# Patient Record
Sex: MALE | HISPANIC OR LATINO | Employment: OTHER | URBAN - METROPOLITAN AREA
[De-identification: names, ages, dates, MRNs, and addresses within clinical notes are randomized per-mention and may not be internally consistent; named-entity substitution may affect disease eponyms.]

---

## 2017-03-01 ENCOUNTER — TELEPHONE (OUTPATIENT)
Dept: VASCULAR LAB | Facility: MEDICAL CENTER | Age: 82
End: 2017-03-01

## 2017-09-13 DIAGNOSIS — I48.91 ATRIAL FIBRILLATION, UNSPECIFIED TYPE (HCC): ICD-10-CM

## 2017-09-18 ENCOUNTER — ANTICOAGULATION VISIT (OUTPATIENT)
Dept: VASCULAR LAB | Facility: MEDICAL CENTER | Age: 82
End: 2017-09-18
Attending: INTERNAL MEDICINE
Payer: MEDICARE

## 2017-09-18 VITALS — DIASTOLIC BLOOD PRESSURE: 81 MMHG | SYSTOLIC BLOOD PRESSURE: 147 MMHG | HEART RATE: 69 BPM

## 2017-09-18 DIAGNOSIS — I48.91 ATRIAL FIBRILLATION, UNSPECIFIED TYPE (HCC): ICD-10-CM

## 2017-09-18 PROCEDURE — 99211 OFF/OP EST MAY X REQ PHY/QHP: CPT | Performed by: NURSE PRACTITIONER

## 2017-09-18 RX ORDER — ASPIRIN 81 MG/1
81 TABLET, CHEWABLE ORAL DAILY
Status: ON HOLD | COMMUNITY
End: 2020-04-19

## 2017-09-18 NOTE — PROGRESS NOTES
Anticoagulation Summary  As of 9/18/2017    INR goal:   2.0-3.0   TTR:   58.6 % (2.6 mo)   Today's INR:   No new INR was available at the time of this encounter.   Maintenance plan:   2.5 mg (2.5 mg x 1) on Sun, Thu; 3.75 mg (2.5 mg x 1.5) all other days   Weekly total:   23.75 mg   Plan last modified:   Lisa Veronica, PharmD (3/29/2016)   Next INR check:      Target end date:   Indefinite    Indications    Atrial fibrillation (CMS-HCC) [I48.91]             Anticoagulation Episode Summary     INR check location:   [facility]    Preferred lab:       Send INR reminders to:       Comments:         Anticoagulation Care Providers     Provider Role Specialty Phone number    Porfirio Marshall M.D. Referring Cardiology [phone]    [facility] Responsible  [phone]        Anticoagulation Patient Findings      HPI:  Des Keller seen in clinic today to reestablish care with anticoagulation clinic for hx of AF. Patient states he was taken off warfarin over 1 year ago and is taking baby ASA daily. [id number] score = 3. We discussed risks/benefits of restarting warfarin, however, he declines.  He understands he has an increased risk of stroke with AF. Still wants to stay on ASA.     Pt to f/u with his PCP and cardiologist. Med rec updated.    Estela SANTANA

## 2018-03-22 ENCOUNTER — HOSPITAL ENCOUNTER (OUTPATIENT)
Dept: LAB | Facility: MEDICAL CENTER | Age: 83
End: 2018-03-22
Attending: NURSE PRACTITIONER
Payer: MEDICARE

## 2018-03-22 PROCEDURE — 87186 SC STD MICRODIL/AGAR DIL: CPT

## 2018-03-22 PROCEDURE — 87086 URINE CULTURE/COLONY COUNT: CPT

## 2018-03-22 PROCEDURE — 87077 CULTURE AEROBIC IDENTIFY: CPT

## 2018-03-24 LAB
BACTERIA UR CULT: ABNORMAL
BACTERIA UR CULT: ABNORMAL
SIGNIFICANT IND 70042: ABNORMAL
SITE SITE: ABNORMAL
SOURCE SOURCE: ABNORMAL

## 2018-05-24 ENCOUNTER — PATIENT OUTREACH (OUTPATIENT)
Dept: HEALTH INFORMATION MANAGEMENT | Facility: OTHER | Age: 83
End: 2018-05-24

## 2018-05-24 NOTE — PROGRESS NOTES
Outcome: Left Message to schedule est sherita/verify pcp    Please transfer to Patient Outreach Team at [phone] when patient returns call.    WebIZ Checked & Epic Updated:  yes    [facility] Verified: yes    Attempt # 1

## 2018-08-16 ENCOUNTER — APPOINTMENT (OUTPATIENT)
Dept: CARDIOLOGY | Facility: MEDICAL CENTER | Age: 83
End: 2018-08-16
Attending: PHYSICIAN ASSISTANT
Payer: MEDICARE

## 2019-03-05 ENCOUNTER — PATIENT OUTREACH (OUTPATIENT)
Dept: HEALTH INFORMATION MANAGEMENT | Facility: OTHER | Age: 84
End: 2019-03-05

## 2019-03-07 NOTE — PROGRESS NOTES
Situation    TCN met with patient at SNF to discuss his DC plan and [facility] program. Patient plans to DC home alone with family support and [facility]. Patient is agreeable to CCM program. Intake not completed this date due to patient being needed in therapy.    Background       Patient was in [place] from 2/1/19 til 2/5/19 after a GLF resulted in a R hip fracture requiring an IMN. Patient  PMH includes, HTN, bladder outlet obstruction, A-fib, AV block, pacemaker and EF of 60-65%. Patient was admitted to SNF at [place] on 2/5/19 for strength and mobility training prior to DC home alone.   Assessment    Patient is progressing well at SNF and scheduled to DC home alone with intermittent family support on 3/8/19 with [facility] and F[facility]. Patient is currently at a SBA/Sup level for all ADLS and mobility. Patient feels he is ready to DC home at this time and feels he will have sufficient support from his family for the care he will need.    Recommendation TCN to contact patient on 3/11/19.      Janeth Redmond, PT DPT TCN  [phone]

## 2019-03-11 ENCOUNTER — PATIENT OUTREACH (OUTPATIENT)
Dept: HEALTH INFORMATION MANAGEMENT | Facility: OTHER | Age: 84
End: 2019-03-11

## 2019-03-11 NOTE — PROGRESS NOTES
Situation    TCN contacted patient at home. Patient reports he is doing well and his family is with him most of the time. HH is supposed to see patient later. Patient feels he has enough support at home and requested to discontinue TCN follow up. Patient has TCN card with phone number if needs arise.    Background       Patient was in [place] from 2/1/19 til 2/5/19 after a GLF resulted in a R hip fracture requiring an IMN. Patient  PMH includes, HTN, bladder outlet obstruction, A-fib, AV block, pacemaker and EF of 60-65%. Patient was admitted to SNF at [place] on 2/5/19 for strength and mobility training prior to DC home alone.   Assessment    Patient is doing well at home and well supported by family. Patient requested TCN discontinue follow up calls at this time as he feels he has enough support and check in from family and HH.    Recommendation TCN to DC patient from follow up at this time per patient request. Patient has TCN contact information if new needs or concerns arise.      Janeth Redmond, PT DPT TCN  [phone]

## 2020-02-04 ENCOUNTER — ANTICOAGULATION MONITORING (OUTPATIENT)
Dept: VASCULAR LAB | Facility: MEDICAL CENTER | Age: 85
End: 2020-02-04

## 2020-02-04 NOTE — PROGRESS NOTES
Discharged from [facility].  Pt is no longer anticoagulated.  Estefanía Collins, Clinical Pharmacist, CDE, CACP

## 2020-04-16 ENCOUNTER — HOSPITAL ENCOUNTER (INPATIENT)
Facility: MEDICAL CENTER | Age: 85
LOS: 7 days | DRG: 536 | End: 2020-04-24
Attending: EMERGENCY MEDICINE | Admitting: HOSPITALIST
Payer: MEDICARE

## 2020-04-16 ENCOUNTER — APPOINTMENT (OUTPATIENT)
Dept: RADIOLOGY | Facility: MEDICAL CENTER | Age: 85
DRG: 536 | End: 2020-04-16
Attending: EMERGENCY MEDICINE
Payer: MEDICARE

## 2020-04-16 DIAGNOSIS — S32.509A CLOSED FRACTURE OF PUBIS, UNSPECIFIED PORTION OF PUBIS, UNSPECIFIED LATERALITY, INITIAL ENCOUNTER (HCC): ICD-10-CM

## 2020-04-16 DIAGNOSIS — R50.9 FEVER, UNSPECIFIED FEVER CAUSE: ICD-10-CM
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
## 2020-04-16 LAB
ALBUMIN SERPL BCP-MCNC: 3.9 G/DL (ref 3.2–4.9)
ALBUMIN/GLOB SERPL: 1.4 G/DL
ALP SERPL-CCNC: 100 U/L (ref 30–99)
ALT SERPL-CCNC: 20 U/L (ref 2–50)
ANION GAP SERPL CALC-SCNC: 13 MMOL/L (ref 7–16)
APPEARANCE UR: CLEAR
AST SERPL-CCNC: 24 U/L (ref 12–45)
BACTERIA #/AREA URNS HPF: NEGATIVE /HPF
BASOPHILS # BLD AUTO: 0.2 % (ref 0–1.8)
BASOPHILS # BLD: 0.03 K/UL (ref 0–0.12)
BILIRUB SERPL-MCNC: 3.3 MG/DL (ref 0.1–1.5)
BILIRUB UR QL STRIP.AUTO: NEGATIVE
BUN SERPL-MCNC: 15 MG/DL (ref 8–22)
CALCIUM SERPL-MCNC: 9.1 MG/DL (ref 8.5–10.5)
CHLORIDE SERPL-SCNC: 100 MMOL/L (ref 96–112)
CK SERPL-CCNC: 282 U/L (ref 0–154)
CO2 SERPL-SCNC: 19 MMOL/L (ref 20–33)
COLOR UR: YELLOW
COVID ORDER STATUS COVID19: NORMAL
CREAT SERPL-MCNC: 1.01 MG/DL (ref 0.5–1.4)
CRP SERPL HS-MCNC: 3.5 MG/DL (ref 0–0.75)
EKG IMPRESSION: NORMAL
EOSINOPHIL # BLD AUTO: 0 K/UL (ref 0–0.51)
EOSINOPHIL NFR BLD: 0 % (ref 0–6.9)
EPI CELLS #/AREA URNS HPF: NEGATIVE /HPF
ERYTHROCYTE [DISTWIDTH] IN BLOOD BY AUTOMATED COUNT: 44 FL (ref 35.9–50)
ERYTHROCYTE [SEDIMENTATION RATE] IN BLOOD BY WESTERGREN METHOD: 5 MM/HOUR (ref 0–20)
FERRITIN SERPL-MCNC: 124 NG/ML (ref 22–322)
FLUAV RNA SPEC QL NAA+PROBE: NEGATIVE
FLUBV RNA SPEC QL NAA+PROBE: NEGATIVE
GLOBULIN SER CALC-MCNC: 2.8 G/DL (ref 1.9–3.5)
GLUCOSE SERPL-MCNC: 128 MG/DL (ref 65–99)
GLUCOSE UR STRIP.AUTO-MCNC: NEGATIVE MG/DL
HCT VFR BLD AUTO: 42 % (ref 42–52)
HGB BLD-MCNC: 14.4 G/DL (ref 14–18)
HYALINE CASTS #/AREA URNS LPF: ABNORMAL /LPF
IMM GRANULOCYTES # BLD AUTO: 0.1 K/UL (ref 0–0.11)
IMM GRANULOCYTES NFR BLD AUTO: 0.7 % (ref 0–0.9)
INR PPP: 1.37 (ref 0.87–1.13)
KETONES UR STRIP.AUTO-MCNC: NEGATIVE MG/DL
LACTATE BLD-SCNC: 1.6 MMOL/L (ref 0.5–2)
LACTATE BLD-SCNC: 2.4 MMOL/L (ref 0.5–2)
LDH SERPL L TO P-CCNC: 274 U/L (ref 107–266)
LEUKOCYTE ESTERASE UR QL STRIP.AUTO: NEGATIVE
LYMPHOCYTES # BLD AUTO: 0.85 K/UL (ref 1–4.8)
LYMPHOCYTES NFR BLD: 5.6 % (ref 22–41)
MAGNESIUM SERPL-MCNC: 1.5 MG/DL (ref 1.5–2.5)
MCH RBC QN AUTO: 32.2 PG (ref 27–33)
MCHC RBC AUTO-ENTMCNC: 34.3 G/DL (ref 33.7–35.3)
MCV RBC AUTO: 94 FL (ref 81.4–97.8)
MICRO URNS: ABNORMAL
MONOCYTES # BLD AUTO: 1.23 K/UL (ref 0–0.85)
MONOCYTES NFR BLD AUTO: 8.1 % (ref 0–13.4)
NEUTROPHILS # BLD AUTO: 12.93 K/UL (ref 1.82–7.42)
NEUTROPHILS NFR BLD: 85.4 % (ref 44–72)
NITRITE UR QL STRIP.AUTO: NEGATIVE
NRBC # BLD AUTO: 0 K/UL
NRBC BLD-RTO: 0 /100 WBC
PH UR STRIP.AUTO: 5 [PH] (ref 5–8)
PHOSPHATE SERPL-MCNC: 2.5 MG/DL (ref 2.5–4.5)
PLATELET # BLD AUTO: 120 K/UL (ref 164–446)
PMV BLD AUTO: 10.7 FL (ref 9–12.9)
POTASSIUM SERPL-SCNC: 4.3 MMOL/L (ref 3.6–5.5)
PROCALCITONIN SERPL-MCNC: <0.05 NG/ML
PROT SERPL-MCNC: 6.7 G/DL (ref 6–8.2)
PROT UR QL STRIP: NEGATIVE MG/DL
PROTHROMBIN TIME: 17.2 SEC (ref 12–14.6)
RBC # BLD AUTO: 4.47 M/UL (ref 4.7–6.1)
RBC # URNS HPF: ABNORMAL /HPF
RBC UR QL AUTO: ABNORMAL
SARS-COV-2 RNA RESP QL NAA+PROBE: NEGATIVE
SODIUM SERPL-SCNC: 132 MMOL/L (ref 135–145)
SP GR UR STRIP.AUTO: 1.01
SPECIMEN SOURCE: NORMAL
UROBILINOGEN UR STRIP.AUTO-MCNC: 1 MG/DL
WBC # BLD AUTO: 15.1 K/UL (ref 4.8–10.8)
WBC #/AREA URNS HPF: ABNORMAL /HPF

## 2020-04-16 PROCEDURE — 700117 HCHG RX CONTRAST REV CODE 255: Performed by: EMERGENCY MEDICINE

## 2020-04-16 PROCEDURE — 74177 CT ABD & PELVIS W/CONTRAST: CPT

## 2020-04-16 PROCEDURE — 84145 PROCALCITONIN (PCT): CPT

## 2020-04-16 PROCEDURE — 85610 PROTHROMBIN TIME: CPT

## 2020-04-16 PROCEDURE — 99285 EMERGENCY DEPT VISIT HI MDM: CPT

## 2020-04-16 PROCEDURE — 83615 LACTATE (LD) (LDH) ENZYME: CPT

## 2020-04-16 PROCEDURE — 71045 X-RAY EXAM CHEST 1 VIEW: CPT

## 2020-04-16 PROCEDURE — 70450 CT HEAD/BRAIN W/O DYE: CPT

## 2020-04-16 PROCEDURE — 93005 ELECTROCARDIOGRAM TRACING: CPT | Performed by: EMERGENCY MEDICINE

## 2020-04-16 PROCEDURE — 73501 X-RAY EXAM HIP UNI 1 VIEW: CPT | Mod: LT

## 2020-04-16 PROCEDURE — 94760 N-INVAS EAR/PLS OXIMETRY 1: CPT

## 2020-04-16 PROCEDURE — 96368 THER/DIAG CONCURRENT INF: CPT

## 2020-04-16 PROCEDURE — G2023 SPECIMEN COLLECT COVID-19: HCPCS | Performed by: EMERGENCY MEDICINE

## 2020-04-16 PROCEDURE — 87040 BLOOD CULTURE FOR BACTERIA: CPT

## 2020-04-16 PROCEDURE — 80053 COMPREHEN METABOLIC PANEL: CPT

## 2020-04-16 PROCEDURE — 87086 URINE CULTURE/COLONY COUNT: CPT

## 2020-04-16 PROCEDURE — A9270 NON-COVERED ITEM OR SERVICE: HCPCS | Performed by: EMERGENCY MEDICINE

## 2020-04-16 PROCEDURE — 700102 HCHG RX REV CODE 250 W/ 637 OVERRIDE(OP): Performed by: EMERGENCY MEDICINE

## 2020-04-16 PROCEDURE — 700111 HCHG RX REV CODE 636 W/ 250 OVERRIDE (IP): Performed by: EMERGENCY MEDICINE

## 2020-04-16 PROCEDURE — 81001 URINALYSIS AUTO W/SCOPE: CPT

## 2020-04-16 PROCEDURE — 96365 THER/PROPH/DIAG IV INF INIT: CPT

## 2020-04-16 PROCEDURE — U0004 COV-19 TEST NON-CDC HGH THRU: HCPCS

## 2020-04-16 PROCEDURE — 85025 COMPLETE CBC W/AUTO DIFF WBC: CPT

## 2020-04-16 PROCEDURE — 87502 INFLUENZA DNA AMP PROBE: CPT

## 2020-04-16 PROCEDURE — 82728 ASSAY OF FERRITIN: CPT

## 2020-04-16 PROCEDURE — 83735 ASSAY OF MAGNESIUM: CPT

## 2020-04-16 PROCEDURE — 700105 HCHG RX REV CODE 258: Performed by: EMERGENCY MEDICINE

## 2020-04-16 PROCEDURE — 84100 ASSAY OF PHOSPHORUS: CPT

## 2020-04-16 PROCEDURE — 83605 ASSAY OF LACTIC ACID: CPT | Mod: 91

## 2020-04-16 PROCEDURE — 85652 RBC SED RATE AUTOMATED: CPT

## 2020-04-16 PROCEDURE — 700101 HCHG RX REV CODE 250: Performed by: EMERGENCY MEDICINE

## 2020-04-16 PROCEDURE — 36415 COLL VENOUS BLD VENIPUNCTURE: CPT

## 2020-04-16 PROCEDURE — 86140 C-REACTIVE PROTEIN: CPT

## 2020-04-16 PROCEDURE — 82550 ASSAY OF CK (CPK): CPT

## 2020-04-16 RX ORDER — SODIUM CHLORIDE 9 MG/ML
500 INJECTION, SOLUTION INTRAVENOUS ONCE
Status: COMPLETED | OUTPATIENT
Start: 2020-04-16 | End: 2020-04-16

## 2020-04-16 RX ORDER — OXYCODONE HYDROCHLORIDE 5 MG/1
5 TABLET ORAL ONCE
Status: COMPLETED | OUTPATIENT
Start: 2020-04-16 | End: 2020-04-16

## 2020-04-16 RX ORDER — ACETAMINOPHEN 325 MG/1
650 TABLET ORAL ONCE
Status: COMPLETED | OUTPATIENT
Start: 2020-04-16 | End: 2020-04-16

## 2020-04-16 RX ADMIN — CEFEPIME 2 G: 2 INJECTION, POWDER, FOR SOLUTION INTRAVENOUS at 21:12

## 2020-04-16 RX ADMIN — ACETAMINOPHEN 650 MG: 325 TABLET, FILM COATED ORAL at 19:51

## 2020-04-16 RX ADMIN — SODIUM CHLORIDE 500 ML: 9 INJECTION, SOLUTION INTRAVENOUS at 19:50

## 2020-04-16 RX ADMIN — OXYCODONE HYDROCHLORIDE 5 MG: 5 TABLET ORAL at 21:32

## 2020-04-16 RX ADMIN — IOHEXOL 100 ML: 350 INJECTION, SOLUTION INTRAVENOUS at 21:00

## 2020-04-16 RX ADMIN — METRONIDAZOLE 500 MG: 500 INJECTION, SOLUTION INTRAVENOUS at 21:33

## 2020-04-16 ASSESSMENT — LIFESTYLE VARIABLES
EVER FELT BAD OR GUILTY ABOUT YOUR DRINKING: NO
HAVE PEOPLE ANNOYED YOU BY CRITICIZING YOUR DRINKING: NO
DO YOU DRINK ALCOHOL: YES
CONSUMPTION TOTAL: NEGATIVE
TOTAL SCORE: 0
HAVE YOU EVER FELT YOU SHOULD CUT DOWN ON YOUR DRINKING: NO
ON A TYPICAL DAY WHEN YOU DRINK ALCOHOL HOW MANY DRINKS DO YOU HAVE: 1
AVERAGE NUMBER OF DAYS PER WEEK YOU HAVE A DRINK CONTAINING ALCOHOL: 1
DOES PATIENT WANT TO STOP DRINKING: NO
TOTAL SCORE: 0
HOW MANY TIMES IN THE PAST YEAR HAVE YOU HAD 5 OR MORE DRINKS IN A DAY: 0
TOTAL SCORE: 0
EVER HAD A DRINK FIRST THING IN THE MORNING TO STEADY YOUR NERVES TO GET RID OF A HANGOVER: NO

## 2020-04-17 ENCOUNTER — PATIENT OUTREACH (OUTPATIENT)
Dept: HEALTH INFORMATION MANAGEMENT | Facility: OTHER | Age: 85
End: 2020-04-17

## 2020-04-17 PROBLEM — Z20.822 SUSPECTED COVID-19 VIRUS INFECTION: Status: ACTIVE | Noted: 2020-04-17

## 2020-04-17 PROBLEM — S32.599A PUBIC RAMUS FRACTURE (HCC): Status: ACTIVE | Noted: 2020-04-17

## 2020-04-17 PROBLEM — I48.0 PAROXYSMAL ATRIAL FIBRILLATION (HCC): Status: ACTIVE | Noted: 2020-04-17

## 2020-04-17 LAB
COVID ORDER STATUS COVID19: NORMAL
LACTATE BLD-SCNC: 1.4 MMOL/L (ref 0.5–2)
SARS-COV-2 RNA RESP QL NAA+PROBE: NEGATIVE
SPECIMEN SOURCE: NORMAL

## 2020-04-17 PROCEDURE — 97166 OT EVAL MOD COMPLEX 45 MIN: CPT

## 2020-04-17 PROCEDURE — 99223 1ST HOSP IP/OBS HIGH 75: CPT | Mod: AI | Performed by: HOSPITALIST

## 2020-04-17 PROCEDURE — 700102 HCHG RX REV CODE 250 W/ 637 OVERRIDE(OP): Performed by: HOSPITALIST

## 2020-04-17 PROCEDURE — A9270 NON-COVERED ITEM OR SERVICE: HCPCS | Performed by: HOSPITALIST

## 2020-04-17 PROCEDURE — G2023 SPECIMEN COLLECT COVID-19: HCPCS | Performed by: INTERNAL MEDICINE

## 2020-04-17 PROCEDURE — 700111 HCHG RX REV CODE 636 W/ 250 OVERRIDE (IP): Performed by: HOSPITALIST

## 2020-04-17 PROCEDURE — U0004 COV-19 TEST NON-CDC HGH THRU: HCPCS

## 2020-04-17 PROCEDURE — 770021 HCHG ROOM/CARE - ISO PRIVATE

## 2020-04-17 PROCEDURE — 97162 PT EVAL MOD COMPLEX 30 MIN: CPT

## 2020-04-17 RX ORDER — ONDANSETRON 4 MG/1
4 TABLET, ORALLY DISINTEGRATING ORAL EVERY 4 HOURS PRN
Status: DISCONTINUED | OUTPATIENT
Start: 2020-04-17 | End: 2020-04-24 | Stop reason: HOSPADM

## 2020-04-17 RX ORDER — OXYCODONE HYDROCHLORIDE 5 MG/1
2.5 TABLET ORAL
Status: DISCONTINUED | OUTPATIENT
Start: 2020-04-17 | End: 2020-04-24 | Stop reason: HOSPADM

## 2020-04-17 RX ORDER — CARVEDILOL 6.25 MG/1
6.25 TABLET ORAL 2 TIMES DAILY WITH MEALS
Status: DISCONTINUED | OUTPATIENT
Start: 2020-04-17 | End: 2020-04-17

## 2020-04-17 RX ORDER — AMOXICILLIN 250 MG
2 CAPSULE ORAL 2 TIMES DAILY
Status: DISCONTINUED | OUTPATIENT
Start: 2020-04-17 | End: 2020-04-24 | Stop reason: HOSPADM

## 2020-04-17 RX ORDER — BISACODYL 10 MG
10 SUPPOSITORY, RECTAL RECTAL
Status: DISCONTINUED | OUTPATIENT
Start: 2020-04-17 | End: 2020-04-24 | Stop reason: HOSPADM

## 2020-04-17 RX ORDER — LOSARTAN POTASSIUM 50 MG/1
50 TABLET ORAL DAILY
Status: DISCONTINUED | OUTPATIENT
Start: 2020-04-17 | End: 2020-04-17

## 2020-04-17 RX ORDER — ASPIRIN 81 MG/1
81 TABLET, CHEWABLE ORAL DAILY
Status: DISCONTINUED | OUTPATIENT
Start: 2020-04-17 | End: 2020-04-17

## 2020-04-17 RX ORDER — ONDANSETRON 2 MG/ML
4 INJECTION INTRAMUSCULAR; INTRAVENOUS EVERY 4 HOURS PRN
Status: DISCONTINUED | OUTPATIENT
Start: 2020-04-17 | End: 2020-04-24 | Stop reason: HOSPADM

## 2020-04-17 RX ORDER — CLONIDINE HYDROCHLORIDE 0.1 MG/1
0.1 TABLET ORAL 3 TIMES DAILY PRN
Status: DISCONTINUED | OUTPATIENT
Start: 2020-04-17 | End: 2020-04-24 | Stop reason: HOSPADM

## 2020-04-17 RX ORDER — ACETAMINOPHEN 325 MG/1
650 TABLET ORAL EVERY 6 HOURS PRN
Status: DISCONTINUED | OUTPATIENT
Start: 2020-04-17 | End: 2020-04-24 | Stop reason: HOSPADM

## 2020-04-17 RX ORDER — OXYCODONE HYDROCHLORIDE 5 MG/1
5 TABLET ORAL
Status: DISCONTINUED | OUTPATIENT
Start: 2020-04-17 | End: 2020-04-24 | Stop reason: HOSPADM

## 2020-04-17 RX ORDER — POLYETHYLENE GLYCOL 3350 17 G/17G
1 POWDER, FOR SOLUTION ORAL
Status: DISCONTINUED | OUTPATIENT
Start: 2020-04-17 | End: 2020-04-24 | Stop reason: HOSPADM

## 2020-04-17 RX ORDER — MORPHINE SULFATE 4 MG/ML
2 INJECTION, SOLUTION INTRAMUSCULAR; INTRAVENOUS
Status: DISCONTINUED | OUTPATIENT
Start: 2020-04-17 | End: 2020-04-24

## 2020-04-17 RX ADMIN — OXYCODONE HYDROCHLORIDE 5 MG: 5 TABLET ORAL at 01:45

## 2020-04-17 RX ADMIN — ENOXAPARIN SODIUM 40 MG: 100 INJECTION SUBCUTANEOUS at 05:33

## 2020-04-17 RX ADMIN — ACETAMINOPHEN 650 MG: 325 TABLET, FILM COATED ORAL at 21:09

## 2020-04-17 RX ADMIN — SENNOSIDES AND DOCUSATE SODIUM 2 TABLET: 8.6; 5 TABLET ORAL at 05:34

## 2020-04-17 ASSESSMENT — COGNITIVE AND FUNCTIONAL STATUS - GENERAL
STANDING UP FROM CHAIR USING ARMS: A LOT
MOVING TO AND FROM BED TO CHAIR: UNABLE
DAILY ACTIVITIY SCORE: 18
SUGGESTED CMS G CODE MODIFIER DAILY ACTIVITY: CK
WALKING IN HOSPITAL ROOM: TOTAL
DRESSING REGULAR LOWER BODY CLOTHING: A LOT
MOVING FROM LYING ON BACK TO SITTING ON SIDE OF FLAT BED: UNABLE
MOBILITY SCORE: 7
TURNING FROM BACK TO SIDE WHILE IN FLAT BAD: UNABLE
SUGGESTED CMS G CODE MODIFIER MOBILITY: CM
TOILETING: A LOT
CLIMB 3 TO 5 STEPS WITH RAILING: TOTAL
HELP NEEDED FOR BATHING: A LOT

## 2020-04-17 ASSESSMENT — PATIENT HEALTH QUESTIONNAIRE - PHQ9
1. LITTLE INTEREST OR PLEASURE IN DOING THINGS: NOT AT ALL
SUM OF ALL RESPONSES TO PHQ9 QUESTIONS 1 AND 2: 0
2. FEELING DOWN, DEPRESSED, IRRITABLE, OR HOPELESS: NOT AT ALL

## 2020-04-17 ASSESSMENT — ENCOUNTER SYMPTOMS
EYES NEGATIVE: 1
CARDIOVASCULAR NEGATIVE: 1
PSYCHIATRIC NEGATIVE: 1
GASTROINTESTINAL NEGATIVE: 1
CONSTITUTIONAL NEGATIVE: 1
NEUROLOGICAL NEGATIVE: 1
COUGH: 1
FALLS: 1

## 2020-04-17 ASSESSMENT — LIFESTYLE VARIABLES
EVER HAD A DRINK FIRST THING IN THE MORNING TO STEADY YOUR NERVES TO GET RID OF A HANGOVER: NO
TOTAL SCORE: 0
HAVE PEOPLE ANNOYED YOU BY CRITICIZING YOUR DRINKING: NO
DOES PATIENT WANT TO STOP DRINKING: NO
HAVE YOU EVER FELT YOU SHOULD CUT DOWN ON YOUR DRINKING: NO
ON A TYPICAL DAY WHEN YOU DRINK ALCOHOL HOW MANY DRINKS DO YOU HAVE: 0
AVERAGE NUMBER OF DAYS PER WEEK YOU HAVE A DRINK CONTAINING ALCOHOL: 0
TOTAL SCORE: 0
EVER_SMOKED: NEVER
CONSUMPTION TOTAL: NEGATIVE
TOTAL SCORE: 0
ALCOHOL_USE: NO
EVER FELT BAD OR GUILTY ABOUT YOUR DRINKING: NO
HOW MANY TIMES IN THE PAST YEAR HAVE YOU HAD 5 OR MORE DRINKS IN A DAY: 0

## 2020-04-17 ASSESSMENT — COPD QUESTIONNAIRES
IN THE PAST 12 MONTHS DO YOU DO LESS THAN YOU USED TO BECAUSE OF YOUR BREATHING PROBLEMS: DISAGREE/UNSURE
COPD SCREENING SCORE: 2
DURING THE PAST 4 WEEKS HOW MUCH DID YOU FEEL SHORT OF BREATH: NONE/LITTLE OF THE TIME
HAVE YOU SMOKED AT LEAST 100 CIGARETTES IN YOUR ENTIRE LIFE: NO/DON'T KNOW
DO YOU EVER COUGH UP ANY MUCUS OR PHLEGM?: NO/ONLY WITH OCCASIONAL COLDS OR INFECTIONS

## 2020-04-17 ASSESSMENT — FIBROSIS 4 INDEX: FIB4 SCORE: 4.34

## 2020-04-17 ASSESSMENT — GAIT ASSESSMENTS: GAIT LEVEL OF ASSIST: UNABLE TO PARTICIPATE

## 2020-04-17 ASSESSMENT — ACTIVITIES OF DAILY LIVING (ADL): TOILETING: INDEPENDENT

## 2020-04-17 NOTE — THERAPY
"Physical Therapy Evaluation completed.   Bed Mobility:  Supine to Sit: Maximal Assist  Transfers: Sit to Stand: Max Assist  Gait: Level Of Assist: Unable to Participate   Plan of Care: Will benefit from Physical Therapy 4 times per week  Discharge Recommendations: Equipment: Will Continue to Assess for Equipment Needs. Post-acute therapy Discharge to a transitional care facility for continued skilled therapy services.    See \"Rehab Therapy-Acute\" Patient Summary Report for complete documentation.     89 year old male who had a GLF resulting in a non operative pubis ramus fracture and PMHx includes essential HTN, BPH, and A-fib.  Patient currently being ruled out for COVID-19.  Patient demonstrates limitations in strength, ROM, balance, activity tolerance, and sensation which are likely contributing to patient's decline in functional mobility.  Patient currently is not safe to go home, recommending placement to skilled nursing facility for rehabilitation before going home.      Jose Kelley PT  "

## 2020-04-17 NOTE — CONSULTS
4/17/2020    Reason for consultation: Left pelvic ring injury    Consultation on Des Keller at the request of Dr. Sams for a left pelvic ring injury.  The patient is a 89 y.o. male who presents with a left pelvic ring injury due to ground level fall.  The patient noted immediate pain and inability to move the affected extremity due to pain.  They were evaluated in the ER, and Orthopedics was consulted. Patient denies numbness, paresthesias, loss of consciousness or other symptoms related to his pelvis aside from worsened left knee pain.  He also endorses some shortness of breath, and was found to be febrile in the ER.  He is a COVID rule out, initial test negative.    Past Medical History:   Diagnosis Date   • A-fib (HCC)    • BPH (benign prostatic hyperplasia)    • Hypertension    • Hypertension 5/19/2011   • Pacemaker    • Renal insufficiency 4/19/2016       Past Surgical History:   Procedure Laterality Date   • APPENDECTOMY     • CHOLECYSTECTOMY         Medications  No current facility-administered medications on file prior to encounter.      Current Outpatient Medications on File Prior to Encounter   Medication Sig Dispense Refill   • Non Formulary Request EMS reports pt took coumadin at home  PT report he take something for blood   Pt also reports taking something for prostate    4/16/2019     • aspirin (ASA) 81 MG Chew Tab chewable tablet Take 81 mg by mouth every day.     • carvedilol (COREG) 6.25 MG Tab Take 1 Tab by mouth 2 times a day, with meals. 60 Tab 1   • acetaminophen (TYLENOL) 500 MG Tab Take 500 mg by mouth every 6 hours as needed for Moderate Pain.     • losartan (COZAAR) 50 MG Tab Take 1 Tab by mouth every day. 30 Tab 3       Allergies  Patient has no known allergies.    ROS  Per HPI. All other systems were reviewed and found to be negative    History reviewed. No pertinent family history.    Social History     Socioeconomic History   • Marital status:      Spouse name: Not on  "file   • Number of children: Not on file   • Years of education: Not on file   • Highest education level: Not on file   Occupational History   • Not on file   Social Needs   • Financial resource strain: Not on file   • Food insecurity     Worry: Not on file     Inability: Not on file   • Transportation needs     Medical: Not on file     Non-medical: Not on file   Tobacco Use   • Smoking status: Never Smoker   • Smokeless tobacco: Never Used   Substance and Sexual Activity   • Alcohol use: No   • Drug use: Never   • Sexual activity: Not on file     Comment:  ,[place]   Lifestyle   • Physical activity     Days per week: Not on file     Minutes per session: Not on file   • Stress: Not on file   Relationships   • Social connections     Talks on phone: Not on file     Gets together: Not on file     Attends [employer] service: Not on file     Active member of club or organization: Not on file     Attends meetings of clubs or organizations: Not on file     Relationship status: Not on file   • Intimate partner violence     Fear of current or ex partner: Not on file     Emotionally abused: Not on file     Physically abused: Not on file     Forced sexual activity: Not on file   Other Topics Concern   • Not on file   Social History Narrative   • Not on file       Physical Exam  Vitals  /76   Pulse 61   Temp 36.3 °C (97.4 °F) (Temporal)   Resp (!) 22   Ht 1.803 m (5' 11\")   Wt 70.7 kg (155 lb 13.8 oz)   SpO2 94%   General: Well Developed, Well Nourished, no acute distress  Psychiatric: Alert and oriented x3, appropriate responses to questions, pleasant mood and affect.  HEENT: Normocephalic, atraumatic  Eyes: Anicteric, PERRLA, EOMI  Neck: Supple, nontender, no masses  Chest: Symmetric expansion of the chest wall, non-tender to palpation, no distress.  Heart: RRR, palpable peripheral pulses  Abdomen: Soft, NT, ND  Skin: Intact, no open wounds  Extremities: Pain with resisted adduction left hip.  " Nontender in his other extremities.  No deformities.  He does have a moderate effusion in the left knee and is minimally tender here.  He can range his knee on the left which is an improvement from yesterday.  Passive range of motion left hip is not painful.  Neuro: Intact light touch sensation of the feet and toes.  Intact motor function TA/GS/EHL/P.  No upper extremity motor deficits  Vascular: 2+ DP pulses bilateral lower extremities., Capillary refill <2 seconds    Radiographs:  CT-ABDOMEN-PELVIS WITH   Final Result      Acute left parasymphyseal, inferior pubic ramus fractures      Right greater than left perivesicular, iliac fossa hemorrhage measures up to 5 cm long axis      Severe prostate enlargement with moderate bladder wall thickening indicating likely chronic outlet obstruction      Multichamber cardiac enlargement, medium-sized hiatal hernia, cholecystectomy, renal cysts, moderate colonic diverticulosis      CT-HEAD W/O   Final Result      No noncontrast CT evidence of acute intracranial hemorrhage.      Moderate white matter hypodensity is present.  This is a nonspecific finding which usually is found to represent chronic microvascular disease in patient's of this demographic.  Demyelination, age indeterminant ischemia and gliosis are also common    possibilities.      Age-appropriate cerebral volume loss.      A wax plug abuts the left tympanic membrane      DX-HIP-UNILATERAL-WITH PELVIS-1 VIEW LEFT   Final Result      1.  No evidence of left proximal femoral or pelvic fracture.      2.  Old posttraumatic and surgical change of the right proximal femur.      DX-CHEST-PORTABLE (1 VIEW)   Final Result      1.  No acute cardiopulmonary abnormality.   2.  Stable cardiomegaly.          Laboratory Values  Recent Labs     04/16/20 1900   WBC 15.1*   RBC 4.47*   HEMOGLOBIN 14.4   HEMATOCRIT 42.0   MCV 94.0   MCH 32.2   MCHC 34.3   RDW 44.0   PLATELETCT 120*   MPV 10.7     Recent Labs     04/16/20 1900    SODIUM 132*   POTASSIUM 4.3   CHLORIDE 100   CO2 19*   GLUCOSE 128*   BUN 15   CPKTOTAL 282*     Recent Labs     04/16/20  1900   INR 1.37*         Impression:    #1  Minimally displaced left anterior pelvic ring injury and sacral ala fracture    Plan:    Nonoperative management.  He may bear weight as tolerated.  PT should be consulted.  DVT prophylaxis will be with SCDs and Lovenox.  He should follow-up at the [place] orthopedic clinic in 6 weeks.

## 2020-04-17 NOTE — ASSESSMENT & PLAN NOTE
Non-operative. Ortho recommended nonsurgical treatment  PT OT consult  Skilled referral has been placed-working with case management on disposition  Provide Lovenox for DVT prophylaxis (discussed with ortho)-hemoglobin remained stable  -No issues of pain and continue current multimodal pain therapy

## 2020-04-17 NOTE — PROGRESS NOTES
I saw and examined the patient today.  He was admitted earlier today with pubic fracture.  The patient complained about pain especially with moving.  Nonsurgical treatment.  PT OT evaluation.

## 2020-04-17 NOTE — DISCHARGE PLANNING
Care Transition Team Assessment    Chart reviewed. Lives in  in [place]. Anticipate will need HHC vs SNF @ D/C CM will follow as needed.    Information Source  Information Given By: Other (Comments)(Chart reviewed)    Readmission Evaluation  Is this a readmission?: No    Interdisciplinary Discharge Planning  Primary Care Physician: AURE WALLACE  Lives with - Patient's Self Care Capacity: Adult Children  Support Systems: Children  Housing / Facility: Mobile Home  Do You Take your Prescribed Medications Regularly: Yes  Able to Return to Previous ADL's: Future Time w/Therapy  Mobility Issues: Yes  Prior Services: None  Patient Expects to be Discharged to:: Home  Assistance Needed: Unknown at this Time  Durable Medical Equipment: Unknown    Discharge Preparedness  What are your discharge supports?: Child  Prior Functional Level: Ambulatory    Functional Assesment  Prior Functional Level: Ambulatory    Finances  Prescription Coverage: (No insurance listed)    Discharge Risks or Barriers  Patient risk factors: Vulnerable adult    Anticipated Discharge Information  Anticipated discharge disposition: HHC, Home, SNF  Discharge Contact Phone Number: [phone]

## 2020-04-17 NOTE — ED TRIAGE NOTES
Pt arrived to ED with EMS. FAmily called 911 because pt c/o left hip pain following GLF yesterday evening at home. Pt reports he cannot walk related to pain. No shortening or rotation on assessment. CMS intact to leg.  +abrasion to left knee and left elbow.  EMS reports pt does take a blood thinner - pt does not remember name of medication. PERRL. WALDRON.     Pt also reports a cough x2-3 days at home. EMS found him to have a RA of 90% he is 95% on RA at this time. LS clear. Temp 101.7F    Pt is placed in droplet precautions for cough and fever.

## 2020-04-17 NOTE — ED NOTES
Breaking primary RN, collected covid nasal swab and urine, started IV fluid bolus and administered Acetaminophen order. Patient comfortable at this time.

## 2020-04-17 NOTE — ASSESSMENT & PLAN NOTE
Rate currently controlled with coreg 25 mg po bid   -As noted above no anticoagulation which is reasonable given age of 89

## 2020-04-17 NOTE — THERAPY
"Occupational Therapy Evaluation completed.   Functional Status: maxA for LE dressing, setup for seated grooming/hygiene  Plan of Care: Will benefit from Occupational Therapy 4 times per week  Discharge Recommendations:  Equipment: Will Continue to Assess for Equipment Needs. Post-acute therapy Recommend post-acute placement for additional occupational therapy services prior to discharge home. Please consider PM&R consult.    Pt is a 89 y.o. male with prior history of paroxysmal atrial fibrillation which is currently controlled, essential hypertension, and BPH was in his usual state of health until 3 days prior to admission when he tripped, and suffered a ground level fall. Pt has a public ramus fx and is WBAT. Pt lives alone in [facility], however reports he has several family members nearby that assist with IADLs such as grocery shopping. Pt states he is independent for ADLs. Pt currently presents with decreased functional mobility, impaired balance, decreased activity tolerance, and weakness affecting his independence with ADLs and ADL transfers. Pt required maxA for bed mobility, maxAx2 to stand at EOB, maxA for LE dressing, and setup for seated grooming/hygiene. Pt will benefit from continued acute OT tx 4x/week to address ADLs and ADL transfers. Recommend post-acute placement for additional occupational therapy services prior to discharge home. Please consider PM&R consult.     See \"Rehab Therapy-Acute\" Patient Summary Report for complete documentation.    "

## 2020-04-17 NOTE — ASSESSMENT & PLAN NOTE
Continue losartan and Coreg , BP well controlled  (Does have history of CKD but on losartan at home,  Thus continued)

## 2020-04-17 NOTE — ED NOTES
Call out to Emergency contact, Andrea. He is not in town at this time but asked RN to call sister, Manuela, to update medications.

## 2020-04-17 NOTE — H&P
Hospital Medicine History & Physical Note    Date of Service  4/17/2020    Primary Care Physician  Butch Junior M.D.    Consultants  None    Code Status  Full code     Chief Complaint  Left hip pain    History of Presenting Illness  89 y.o. male with prior history of paroxysmal atrial fibrillation which is currently controlled, essential hypertension, and BPH was in his usual state of health until 3 days prior to admission when he tripped, and suffered a ground level fall.  He reports that since that time he has had left hip pain especially when  Moving, somewhat relieved by rest.  He reports no headache, vision change, chest pain, shortness of breath, abdominal pain, nausea or vomiting, diarrhea or constipation.  He states that he has had a cough for months.  When his pain seemed to worsen, he presented to the ED for further evaluation.  He was noted in ED to be febrile.  He denies knowledge of fever or chills at this time.      Review of Systems  Review of Systems   Constitutional: Negative.    HENT: Negative.    Eyes: Negative.    Respiratory: Positive for cough.    Cardiovascular: Negative.    Gastrointestinal: Negative.    Genitourinary: Negative.    Musculoskeletal: Positive for falls and joint pain.   Skin: Negative.    Neurological: Negative.    Endo/Heme/Allergies: Negative.    Psychiatric/Behavioral: Negative.        Past Medical History   has a past medical history of A-fib (HCC), BPH (benign prostatic hyperplasia), Hypertension, Hypertension (5/19/2011), Pacemaker, and Renal insufficiency (4/19/2016).    Surgical History   has a past surgical history that includes cholecystectomy and appendectomy.     Family History  Reviewed and non-pertinent     Social History   reports that he has never smoked. He has never used smokeless tobacco. He reports that he does not drink alcohol or use drugs.    Allergies  No Known Allergies    Medications  Prior to Admission Medications   Prescriptions Last Dose  Informant Patient Reported? Taking?   Non Formulary Request   Yes Yes   Sig: EMS reports pt took coumadin at home  PT report he take something for blood   Pt also reports taking something for prostate    4/16/2019   acetaminophen (TYLENOL) 500 MG Tab  Patient Yes No   Sig: Take 500 mg by mouth every 6 hours as needed for Moderate Pain.   aspirin (ASA) 81 MG Chew Tab chewable tablet not taking  Yes No   Sig: Take 81 mg by mouth every day.   carvedilol (COREG) 6.25 MG Tab not taking  No No   Sig: Take 1 Tab by mouth 2 times a day, with meals.   losartan (COZAAR) 50 MG Tab not taking Patient No No   Sig: Take 1 Tab by mouth every day.      Facility-Administered Medications: None       Physical Exam  Temp:  [36.7 °C (98.1 °F)-38.7 °C (101.7 °F)] 36.7 °C (98.1 °F)  Pulse:  [57-60] 60  Resp:  [25-46] 25  BP: (158-195)/(74-94) 158/83  SpO2:  [92 %-95 %] 95 %    Physical Exam  Vitals signs and nursing note reviewed.   Constitutional:       General: He is not in acute distress.     Appearance: Normal appearance. He is not ill-appearing.   HENT:      Head: Normocephalic and atraumatic.      Nose: Nose normal.      Mouth/Throat:      Mouth: Mucous membranes are moist.      Pharynx: Oropharynx is clear. No oropharyngeal exudate or posterior oropharyngeal erythema.   Eyes:      Extraocular Movements: Extraocular movements intact.      Conjunctiva/sclera: Conjunctivae normal.      Pupils: Pupils are equal, round, and reactive to light.   Neck:      Musculoskeletal: Normal range of motion and neck supple. No muscular tenderness.   Cardiovascular:      Rate and Rhythm: Normal rate and regular rhythm.      Pulses: Normal pulses.      Heart sounds: Normal heart sounds. No murmur. No friction rub. No gallop.    Pulmonary:      Effort: Pulmonary effort is normal. No respiratory distress.      Breath sounds: Normal breath sounds. No wheezing, rhonchi or rales.   Abdominal:      General: Abdomen is flat. Bowel sounds are normal. There is  no distension.      Palpations: Abdomen is soft. There is no mass.      Tenderness: There is no abdominal tenderness.   Musculoskeletal: Normal range of motion.         General: No swelling or deformity.   Lymphadenopathy:      Cervical: No cervical adenopathy.   Skin:     General: Skin is warm and dry.   Neurological:      General: No focal deficit present.      Mental Status: He is alert and oriented to person, place, and time.      Cranial Nerves: No cranial nerve deficit.      Motor: No weakness.      Gait: Gait normal.   Psychiatric:         Mood and Affect: Mood normal.         Behavior: Behavior normal.         Laboratory:  Recent Labs     04/16/20 1900   WBC 15.1*   RBC 4.47*   HEMOGLOBIN 14.4   HEMATOCRIT 42.0   MCV 94.0   MCH 32.2   MCHC 34.3   RDW 44.0   PLATELETCT 120*   MPV 10.7     Recent Labs     04/16/20 1900   SODIUM 132*   POTASSIUM 4.3   CHLORIDE 100   CO2 19*   GLUCOSE 128*   BUN 15   CREATININE 1.01   CALCIUM 9.1     Recent Labs     04/16/20 1900   ALTSGPT 20   ASTSGOT 24   ALKPHOSPHAT 100*   TBILIRUBIN 3.3*   GLUCOSE 128*     Recent Labs     04/16/20 1900   INR 1.37*     No results for input(s): NTPROBNP in the last 72 hours.      No results for input(s): TROPONINT in the last 72 hours.    Urinalysis:    Recent Labs     04/16/20 1949   SPECGRAVITY 1.011   GLUCOSEUR Negative   KETONES Negative   NITRITE Negative   LEUKESTERAS Negative   WBCURINE 0-2*   RBCURINE 2-5*   BACTERIA Negative   EPITHELCELL Negative        Imaging:  CT-ABDOMEN-PELVIS WITH   Final Result      Acute left parasymphyseal, inferior pubic ramus fractures      Right greater than left perivesicular, iliac fossa hemorrhage measures up to 5 cm long axis      Severe prostate enlargement with moderate bladder wall thickening indicating likely chronic outlet obstruction      Multichamber cardiac enlargement, medium-sized hiatal hernia, cholecystectomy, renal cysts, moderate colonic diverticulosis      CT-HEAD W/O   Final Result       No noncontrast CT evidence of acute intracranial hemorrhage.      Moderate white matter hypodensity is present.  This is a nonspecific finding which usually is found to represent chronic microvascular disease in patient's of this demographic.  Demyelination, age indeterminant ischemia and gliosis are also common    possibilities.      Age-appropriate cerebral volume loss.      A wax plug abuts the left tympanic membrane      DX-HIP-UNILATERAL-WITH PELVIS-1 VIEW LEFT   Final Result      1.  No evidence of left proximal femoral or pelvic fracture.      2.  Old posttraumatic and surgical change of the right proximal femur.      DX-CHEST-PORTABLE (1 VIEW)   Final Result      1.  No acute cardiopulmonary abnormality.   2.  Stable cardiomegaly.            Assessment/Plan:  I anticipate this patient will require at least two midnights for appropriate medical management, necessitating inpatient admission.    * Pubic ramus fracture (HCC)  Assessment & Plan  Weight bearing as tolerated.  Appreciate orthopedic consultation.  Non-operative.  PT and OT evaluation, pain management as needed.      Essential hypertension- (present on admission)  Assessment & Plan  Uncontrolled with current regimen.  Suspect at least in part due to pain from pubic ramus fracture.  Monitor.     Suspected COVID-19 virus infection  Assessment & Plan  Based on cough, which may be chronic and fever of undetermined origin.  No evidence of hypoxia or changes on chest radiography.  Initial rapid testing in ED negative.  Will need COVID consultation.      Paroxysmal atrial fibrillation (HCC)  Assessment & Plan  Rate currently controlled.  Not currently on anticoagulation.  Monitor.      BPH (benign prostatic hyperplasia)- (present on admission)  Assessment & Plan  Controlled.  Monitor.         VTE prophylaxis: SCd, lovenox

## 2020-04-17 NOTE — ED PROVIDER NOTES
ED Provider Note    Scribed for Dominguez Sams M.D. by Charlotte Kaur. 4/16/2020,  7:03 PM.    Means of Arrival: EMS  History obtained from: Patient   History limited by: None     CHIEF COMPLAINT  Chief Complaint   Patient presents with   • Cough   • Fever   • T-5000 GLF   • Hip Pain     left       HPI  Des Keller is a 89 y.o. male with a history of hypertension who presents to the Emergency Department via EMS for complaints of acute left hip pain onset earlier today after experiencing a ground level fall. Patient notes that he has decreased ambulation secondary to pain. When he is sitting still he notes that pain is alleviated. He is currently on a blood thinner. Denies hitting head during fall or LOC. He states he has diffuse abdominal pain, chronic cough, and fever. Denies chest pain, light headedness.    REVIEW OF SYSTEMS  CONSTITUTIONAL:  Positive for fever.   CARDIOVASCULAR:  No chest discomfort.  RESPIRATORY:  No pleuritic chest pain.  GASTROINTESTINAL:  Positive for diffuse abdominal pain.   GENITOURINARY:   No dysuria.  MUSCULOSKELETAL:  left hip pain  SKIN:  No rash or suspicious lesions. Positive for abrasion to the left knee.   NEUROLOGIC:   No headache.    See HPI for further details.   All other systems are negative.     PAST MEDICAL HISTORY  Past Medical History:   Diagnosis Date   • A-fib (HCC)    • BPH (benign prostatic hyperplasia)    • Hypertension    • Hypertension 5/19/2011   • Pacemaker    • Renal insufficiency 4/19/2016       FAMILY HISTORY  History reviewed. No pertinent family history.    SOCIAL HISTORY   reports that he has never smoked. He has never used smokeless tobacco. He reports that he does not drink alcohol or use drugs.    SURGICAL HISTORY  Past Surgical History:   Procedure Laterality Date   • APPENDECTOMY     • CHOLECYSTECTOMY         CURRENT MEDICATIONS  Home Medications     Reviewed by Ladonna Paulson R.N. (Registered Nurse) on 04/16/20 at 9845  Med List  "Status: Unable to Obtain   Medication Last Dose Status   acetaminophen (TYLENOL) 500 MG Tab  Active   aspirin (ASA) 81 MG Chew Tab chewable tablet not taking Active   carvedilol (COREG) 6.25 MG Tab not taking Active   losartan (COZAAR) 50 MG Tab not taking Active   Non Formulary Request  Active                ALLERGIES  No Known Allergies    PHYSICAL EXAM  VITAL SIGNS: BP (!) 169/86   Pulse (!) 57   Temp (!) 38.7 °C (101.7 °F) (Oral)   Resp (!) 32   Ht 1.803 m (5' 11\")   Wt 68 kg (150 lb)   SpO2 95%   BMI 20.92 kg/m²    Gen: Alert, no acute distress  HEENT: ATNC. Dry mucous membranes.   Eyes: PERRL, EOMI, normal conjunctiva.   Neck: trachea midline  Resp: no respiratory distress, lungs clear.  No wheezes or crackles  CV: No JVD, regular rate and rhythm, no murmurs, rubs, gallops  Abd: Diffuse abdominal tenderness.  No rebound or guarding  Musculoskeletal: Tenderness to lateral left hip   Skin: abrasion to the left knee   Ext: No deformities, moves all extremities spontaneously  Psych: normal mood  Neuro: speech fluent. Alert and awake.    DIAGNOSTIC STUDIES / PROCEDURES     EKG  Results for orders placed or performed during the hospital encounter of 20   EKG   Result Value Ref Range    Report       [facility] Emergency Dept.    Test Date:  2020  Pt Name:    ERIKA BRITO              Department: ER  MRN:        [id number]                      Room:       [facility]  Gender:     Male                         Technician: [id number]  :        [id number]                   Requested By:DANETTE SAMS  Order #:    [id number]                    Reading MD: Danette Sams    Measurements  Intervals                                Axis  Rate:       60                           P:          0  MA:         76                           QRS:        34  QRSD:       140                          T:          258  QT:         516  QTc:        516    Interpretive Statements  VENTRICULAR-PACED COMPLEXES  NO FURTHER " RHYTHM ANALYSIS ATTEMPTED DUE TO PACED RHYTHM  RIGHT BUNDLE BRANCH BLOCK  NONSPECIFIC ST DEPRESSION, ANT-LAT LEADS  Compared to ECG 04/18/2016 10:46:43  ST (T wave) deviation now present  Atrial fibrillation no longer present  Ventricular premature complex(es) no longer present  E lectronically Signed On 4- 20:40:26 PDT by [employer]  Labs Reviewed   LACTIC ACID - Abnormal; Notable for the following components:       Result Value    Lactic Acid 2.4 (*)     All other components within normal limits   CBC WITH DIFFERENTIAL - Abnormal; Notable for the following components:    WBC 15.1 (*)     RBC 4.47 (*)     Platelet Count 120 (*)     Neutrophils-Polys 85.40 (*)     Lymphocytes 5.60 (*)     Neutrophils (Absolute) 12.93 (*)     Lymphs (Absolute) 0.85 (*)     Monos (Absolute) 1.23 (*)     All other components within normal limits   COMP METABOLIC PANEL - Abnormal; Notable for the following components:    Sodium 132 (*)     Co2 19 (*)     Glucose 128 (*)     Alkaline Phosphatase 100 (*)     Total Bilirubin 3.3 (*)     All other components within normal limits   URINALYSIS - Abnormal; Notable for the following components:    Occult Blood Small (*)     All other components within normal limits    Narrative:     Indication for culture:->Septic Shock: Persistent  hypotension,  Lactic acid > 4, vasopressors/inotropes started  Droplet, Contact, and Eye Protection  Rule out COVID-19 Panel.   PROTHROMBIN TIME - Abnormal; Notable for the following components:    PT 17.2 (*)     INR 1.37 (*)     All other components within normal limits    Narrative:     Droplet, Contact, and Eye Protection  Indicate which anticoagulants the patient is on:->UNKNOWN   LDH - Abnormal; Notable for the following components:    LDH Total 274 (*)     All other components within normal limits    Narrative:     Droplet, Contact, and Eye Protection  Indicate which anticoagulants the patient is on:->UNKNOWN   CRP QUANTITIVE  "(NON-CARDIAC) - Abnormal; Notable for the following components:    Stat C-Reactive Protein 3.50 (*)     All other components within normal limits    Narrative:     Droplet, Contact, and Eye Protection  Indicate which anticoagulants the patient is on:->UNKNOWN   URINE MICROSCOPIC (W/UA) - Abnormal; Notable for the following components:    WBC 0-2 (*)     RBC 2-5 (*)     All other components within normal limits    Narrative:     Indication for culture:->Septic Shock: Persistent  hypotension,  Lactic acid > 4, vasopressors/inotropes started  Droplet, Contact, and Eye Protection  Rule out COVID-19 Panel.   CREATINE KINASE - Abnormal; Notable for the following components:    CPK Total 282 (*)     All other components within normal limits    Narrative:     Droplet, Contact, and Eye Protection  Indicate which anticoagulants the patient is on:->UNKNOWN   LACTIC ACID    Narrative:     Repeat if initial lactic acid result is greater than 2   URINE CULTURE(NEW)    Narrative:     Indication for culture:->Septic Shock: Persistent  hypotension,  Lactic acid > 4, vasopressors/inotropes started  Droplet, Contact, and Eye Protection  Rule out COVID-19 Panel.   BLOOD CULTURE    Narrative:     Per Hospital Policy: Only change Specimen Src: to \"Line\" if  specified by physician order.   BLOOD CULTURE    Narrative:     Per Hospital Policy: Only change Specimen Src: to \"Line\" if  specified by physician order.   MAGNESIUM    Narrative:     Droplet, Contact, and Eye Protection  Indicate which anticoagulants the patient is on:->UNKNOWN   PHOSPHORUS    Narrative:     Droplet, Contact, and Eye Protection  Indicate which anticoagulants the patient is on:->UNKNOWN   FERRITIN    Narrative:     Droplet, Contact, and Eye Protection  Indicate which anticoagulants the patient is on:->UNKNOWN   PROCALCITONIN    Narrative:     Droplet, Contact, and Eye Protection  Indicate which anticoagulants the patient is on:->UNKNOWN   SED RATE    Narrative:     " Droplet, Contact, and Eye Protection  Indicate which anticoagulants the patient is on:->UNKNOWN   COVID/SARS COV-2    Narrative:     Droplet, Contact, and Eye Protection  Rule out COVID-19 Panel.   INFLUENZA A/B BY PCR    Narrative:     Indication for culture:->Septic Shock: Persistent  hypotension,  Lactic acid > 4, vasopressors/inotropes started  Droplet, Contact, and Eye Protection  Rule out COVID-19 Panel.   ESTIMATED GFR   SARS-COV-2, PCR (IN-HOUSE)    Narrative:     Droplet, Contact, and Eye Protection  Rule out COVID-19 Panel.   LACTIC ACID     All labs reviewed by me.    RADIOLOGY  CT-ABDOMEN-PELVIS WITH   Final Result      Acute left parasymphyseal, inferior pubic ramus fractures      Right greater than left perivesicular, iliac fossa hemorrhage measures up to 5 cm long axis      Severe prostate enlargement with moderate bladder wall thickening indicating likely chronic outlet obstruction      Multichamber cardiac enlargement, medium-sized hiatal hernia, cholecystectomy, renal cysts, moderate colonic diverticulosis      CT-HEAD W/O   Final Result      No noncontrast CT evidence of acute intracranial hemorrhage.      Moderate white matter hypodensity is present.  This is a nonspecific finding which usually is found to represent chronic microvascular disease in patient's of this demographic.  Demyelination, age indeterminant ischemia and gliosis are also common    possibilities.      Age-appropriate cerebral volume loss.      A wax plug abuts the left tympanic membrane      DX-HIP-UNILATERAL-WITH PELVIS-1 VIEW LEFT   Final Result      1.  No evidence of left proximal femoral or pelvic fracture.      2.  Old posttraumatic and surgical change of the right proximal femur.      DX-CHEST-PORTABLE (1 VIEW)   Final Result      1.  No acute cardiopulmonary abnormality.   2.  Stable cardiomegaly.        The radiologist’s interpretation of all radiology studies have been reviewed by me.    COURSE & MEDICAL DECISION  MAKING  Pertinent Labs & Imaging studies reviewed. (See chart for details)    7:03 PM Patient seen and examined at bedside. Discussed plan of care with plan and patient was given opportunity to ask questions. Ordered for labs imaging to evaluate. Patient will be treated with Tylenol 650 mg and NS infusion 500 mL for his symptoms.     HYDRATION: Based on the patient's presentation of Dehydration and dry mucous membranes the patient was given IV fluids. IV Hydration was used because oral hydration was not adequate alone. Upon recheck following hydration, the patient was somewhat improved.    9:23 PM - Paged Ortho.     9:28 PM -  I discussed the patient's case and the above findings with Dr. Liu (Orthopedics) who reviewed patients imaging. Weight bearing is permitted. He recommends to admit patient to medicine and he will consult with patient tomorrow as inpatient.      9:34 PM - Paged Hospitalist.     9:41 PM -  I discussed the patient's case and the above findings with Dr. Nava (Hospitalist) who agrees to further evaluate the patient in the hospital.      9:43 PM - Patient was reevaluated at bedside. Discussed lab and radiology results with the patient and informed them that imaging shows fracture in pelvis. Informed him of plan for admission for further evaluation. He understands and agrees to plan.     I wore a mask and eye protection throughout this encounter.     Medical Decision Making:  Patient presents with inability walk in the setting of a recent fall.  His EKG demonstrates no significant abnormalities.  He has no chest pain, lightheadedness, or other signs to suggest ACS, massive PE, syncope, seizures.  He does have abdominal tenderness, however CAT scan demonstrates no abnormalities.  Patient arrives with a fever of unclear etiology.  His urinalysis is negative, initial COVID-19 swab negative, chest x-ray negative.  Patient does have elevated bilirubin concerning for possible ascending cholangitis and  was given a dose of cefepime and metronidazole empirically.  Case discussed with orthopedics, who recommend weightbearing as tolerated.  He does have a small amount of bleeding in the iliac fossa, however this is extraperitoneal after discussion with radiology, appears to be a localized bleed, no evidence of active extravasation or indication for IR.  Patient is anticoagulated, CT scan of the head demonstrates no intracranial bleed.  Low suspicion for injury to the chest, abdomen, spine.  Low suspicion for other orthopedic injuries.    DISPOSITION:  Patient will be hospitalized by Dr. Nava in guarded condition.     FINAL IMPRESSION  1. Closed fracture of pubis, unspecified portion of pubis, unspecified laterality, initial encounter ([facility])    2. Fever, unspecified fever cause    3. Fall, initial encounter            ICharlotte (Scribe), am scribing for, and in the presence of, Dominguez Sams M.D..    Electronically signed by: Charlotte Kaur (Scribe), 4/16/2020    IDominguez M.D. personally performed the services described in this documentation, as scribed by Charlotte Kaur in my presence, and it is both accurate and complete. C    The note accurately reflects work and decisions made by me.  Dominguez Sams M.D.  4/16/2020  10:17 PM

## 2020-04-18 LAB
ANION GAP SERPL CALC-SCNC: 11 MMOL/L (ref 7–16)
BACTERIA UR CULT: NORMAL
BASOPHILS # BLD AUTO: 0.3 % (ref 0–1.8)
BASOPHILS # BLD: 0.03 K/UL (ref 0–0.12)
BUN SERPL-MCNC: 21 MG/DL (ref 8–22)
CALCIUM SERPL-MCNC: 8.5 MG/DL (ref 8.5–10.5)
CHLORIDE SERPL-SCNC: 104 MMOL/L (ref 96–112)
CO2 SERPL-SCNC: 21 MMOL/L (ref 20–33)
CREAT SERPL-MCNC: 1.06 MG/DL (ref 0.5–1.4)
EOSINOPHIL # BLD AUTO: 0.23 K/UL (ref 0–0.51)
EOSINOPHIL NFR BLD: 2.4 % (ref 0–6.9)
ERYTHROCYTE [DISTWIDTH] IN BLOOD BY AUTOMATED COUNT: 45.5 FL (ref 35.9–50)
GLUCOSE SERPL-MCNC: 102 MG/DL (ref 65–99)
HCT VFR BLD AUTO: 35.3 % (ref 42–52)
HGB BLD-MCNC: 12.1 G/DL (ref 14–18)
IMM GRANULOCYTES # BLD AUTO: 0.04 K/UL (ref 0–0.11)
IMM GRANULOCYTES NFR BLD AUTO: 0.4 % (ref 0–0.9)
LYMPHOCYTES # BLD AUTO: 1.26 K/UL (ref 1–4.8)
LYMPHOCYTES NFR BLD: 13.4 % (ref 22–41)
MCH RBC QN AUTO: 32.4 PG (ref 27–33)
MCHC RBC AUTO-ENTMCNC: 34.3 G/DL (ref 33.7–35.3)
MCV RBC AUTO: 94.6 FL (ref 81.4–97.8)
MONOCYTES # BLD AUTO: 0.88 K/UL (ref 0–0.85)
MONOCYTES NFR BLD AUTO: 9.4 % (ref 0–13.4)
NEUTROPHILS # BLD AUTO: 6.97 K/UL (ref 1.82–7.42)
NEUTROPHILS NFR BLD: 74.1 % (ref 44–72)
NRBC # BLD AUTO: 0 K/UL
NRBC BLD-RTO: 0 /100 WBC
PLATELET # BLD AUTO: 97 K/UL (ref 164–446)
PMV BLD AUTO: 11 FL (ref 9–12.9)
POTASSIUM SERPL-SCNC: 3.8 MMOL/L (ref 3.6–5.5)
RBC # BLD AUTO: 3.73 M/UL (ref 4.7–6.1)
SIGNIFICANT IND 70042: NORMAL
SITE SITE: NORMAL
SODIUM SERPL-SCNC: 136 MMOL/L (ref 135–145)
SOURCE SOURCE: NORMAL
WBC # BLD AUTO: 9.4 K/UL (ref 4.8–10.8)

## 2020-04-18 PROCEDURE — A9270 NON-COVERED ITEM OR SERVICE: HCPCS | Performed by: INTERNAL MEDICINE

## 2020-04-18 PROCEDURE — 85025 COMPLETE CBC W/AUTO DIFF WBC: CPT

## 2020-04-18 PROCEDURE — 80048 BASIC METABOLIC PNL TOTAL CA: CPT

## 2020-04-18 PROCEDURE — A9270 NON-COVERED ITEM OR SERVICE: HCPCS | Performed by: HOSPITALIST

## 2020-04-18 PROCEDURE — 700102 HCHG RX REV CODE 250 W/ 637 OVERRIDE(OP): Performed by: INTERNAL MEDICINE

## 2020-04-18 PROCEDURE — 700102 HCHG RX REV CODE 250 W/ 637 OVERRIDE(OP): Performed by: HOSPITALIST

## 2020-04-18 PROCEDURE — 700111 HCHG RX REV CODE 636 W/ 250 OVERRIDE (IP): Performed by: HOSPITALIST

## 2020-04-18 PROCEDURE — 770006 HCHG ROOM/CARE - MED/SURG/GYN SEMI*

## 2020-04-18 PROCEDURE — 99233 SBSQ HOSP IP/OBS HIGH 50: CPT | Performed by: INTERNAL MEDICINE

## 2020-04-18 RX ORDER — AMLODIPINE BESYLATE 10 MG/1
10 TABLET ORAL
Status: DISCONTINUED | OUTPATIENT
Start: 2020-04-18 | End: 2020-04-18

## 2020-04-18 RX ORDER — LOSARTAN POTASSIUM 50 MG/1
50 TABLET ORAL
Status: DISCONTINUED | OUTPATIENT
Start: 2020-04-18 | End: 2020-04-24 | Stop reason: HOSPADM

## 2020-04-18 RX ORDER — CARVEDILOL 25 MG/1
25 TABLET ORAL 2 TIMES DAILY WITH MEALS
Status: DISCONTINUED | OUTPATIENT
Start: 2020-04-18 | End: 2020-04-24 | Stop reason: HOSPADM

## 2020-04-18 RX ADMIN — AMLODIPINE BESYLATE 10 MG: 10 TABLET ORAL at 09:27

## 2020-04-18 RX ADMIN — ACETAMINOPHEN 650 MG: 325 TABLET, FILM COATED ORAL at 16:24

## 2020-04-18 RX ADMIN — LOSARTAN POTASSIUM 50 MG: 50 TABLET ORAL at 11:30

## 2020-04-18 RX ADMIN — ACETAMINOPHEN 650 MG: 325 TABLET, FILM COATED ORAL at 04:50

## 2020-04-18 RX ADMIN — ENOXAPARIN SODIUM 40 MG: 100 INJECTION SUBCUTANEOUS at 05:00

## 2020-04-18 RX ADMIN — CARVEDILOL 25 MG: 25 TABLET, FILM COATED ORAL at 11:30

## 2020-04-18 RX ADMIN — ACETAMINOPHEN 650 MG: 325 TABLET, FILM COATED ORAL at 22:28

## 2020-04-18 RX ADMIN — SENNOSIDES AND DOCUSATE SODIUM 2 TABLET: 8.6; 5 TABLET ORAL at 05:00

## 2020-04-18 RX ADMIN — CARVEDILOL 25 MG: 25 TABLET, FILM COATED ORAL at 16:24

## 2020-04-18 RX ADMIN — OXYCODONE HYDROCHLORIDE 5 MG: 5 TABLET ORAL at 16:23

## 2020-04-18 ASSESSMENT — ENCOUNTER SYMPTOMS
FEVER: 0
COUGH: 0
DIZZINESS: 0
INSOMNIA: 0
HEADACHES: 0
EYE REDNESS: 0
BACK PAIN: 0
NERVOUS/ANXIOUS: 0
DIARRHEA: 0
ABDOMINAL PAIN: 0
SPUTUM PRODUCTION: 0
EYE PAIN: 0
EYE DISCHARGE: 0
FALLS: 1
MYALGIAS: 0
HEARTBURN: 0
DEPRESSION: 0
WEIGHT LOSS: 0
SEIZURES: 0
CHILLS: 0
SHORTNESS OF BREATH: 0
BLURRED VISION: 0
STRIDOR: 0
PALPITATIONS: 0
FOCAL WEAKNESS: 0
NAUSEA: 0
NECK PAIN: 0
VOMITING: 0
ORTHOPNEA: 0

## 2020-04-18 NOTE — PROGRESS NOTES
Patient report received from Bharathi CASAS, patient arrived in stable condition.  Patient axox4, pain 10/10 left hip, good appetitte, up with pt / ot, no issues, oxy / tylenol for pain, pleasant, good appetite

## 2020-04-18 NOTE — PROGRESS NOTES
Assessment completed.  Pt A&Ox4.  Respirations even, unlabored on room air.   Pt denies pain at this time.  POC discussed; communication board updated.    Bed in locked, lowest position.  Call light and belongings within reach.  Needs met, will continue to monitor.

## 2020-04-18 NOTE — PROGRESS NOTES
2 RN skin check complete with Guido RN  Devices in place PIV  Skin assessed under devices YES  Confirmed pressure ulcers found on NA.  New potential pressure ulcers noted on NA. Wound consult placed NA  The following interventions in place weight shifting promoted, q2 turns if needed, skin check qshift, promote nutrition    Generalized bruising and scabs

## 2020-04-18 NOTE — PROGRESS NOTES
Isolation De-escalation Review:    1. Confirmed exposure to a person with confirmed COVID-19; or was pt admitted from a facility with known clusters of COVID cases?  no  a. If yes, was a COVID consult placed? N/A  b. One or more Negative COVID-19 result(s)? yes.   i. How many negative COVID-19 tests? 2  c. If repeat testing was done, was the 2nd test performed >5 days from symptom onset, and at least 24hrs from the 1st negative? Yes  2. Alternative diagnosis that explains symptoms? Yes.  Pt reports chronic cough, but RN states she has not observed pt coughing.  3. Afebrile (<100.4F) >24 hours? yes    Reviewed patient with RN, and Dr. Rangel agrees with discontinuing iso.  De-escalation of isolation for COVID-19 is appropriate at this time.

## 2020-04-18 NOTE — PROGRESS NOTES
Hospital Medicine Daily Progress Note    Date of Service  4/18/2020    Chief Complaint  89 y.o. male admitted 4/16/2020 with fall    Hospital Course    89 y.o. male with prior history of paroxysmal atrial fibrillation which is currently controlled, essential hypertension, and BPH admitted for fall and found to have pubic fracture      Interval Problem Update  I saw and examined the patient today.  PT OT evaluated him and recommended SNF.  Surgical service saw him and recommended nonsurgical treatment.    Consultants/Specialty  ortho    Code Status  full    Disposition  To SNF    Review of Systems  Review of Systems   Constitutional: Positive for malaise/fatigue. Negative for chills, fever and weight loss.   HENT: Negative for congestion and nosebleeds.    Eyes: Negative for blurred vision, pain, discharge and redness.   Respiratory: Negative for cough, sputum production, shortness of breath and stridor.    Cardiovascular: Negative for chest pain, palpitations and orthopnea.   Gastrointestinal: Negative for abdominal pain, diarrhea, heartburn, nausea and vomiting.   Genitourinary: Negative for dysuria, frequency and urgency.   Musculoskeletal: Positive for falls and joint pain. Negative for back pain, myalgias and neck pain.   Skin: Negative for itching and rash.   Neurological: Negative for dizziness, focal weakness, seizures and headaches.   Psychiatric/Behavioral: Negative for depression. The patient is not nervous/anxious and does not have insomnia.         Physical Exam  Temp:  [36.2 °C (97.1 °F)-37.1 °C (98.7 °F)] 36.4 °C (97.5 °F)  Pulse:  [59-61] 60  Resp:  [18-22] 18  BP: (141-181)/(66-88) 160/75  SpO2:  [93 %-96 %] 96 %    Physical Exam  Vitals signs reviewed.   Constitutional:       General: He is not in acute distress.     Appearance: Normal appearance.   HENT:      Head: Normocephalic and atraumatic.      Nose: No congestion or rhinorrhea.   Eyes:      Extraocular Movements: Extraocular movements intact.       Pupils: Pupils are equal, round, and reactive to light.   Neck:      Musculoskeletal: Normal range of motion and neck supple.   Cardiovascular:      Rate and Rhythm: Normal rate and regular rhythm.      Pulses: Normal pulses.   Pulmonary:      Effort: Pulmonary effort is normal. No respiratory distress.      Breath sounds: Normal breath sounds.   Abdominal:      General: Bowel sounds are normal. There is no distension.      Palpations: Abdomen is soft.      Tenderness: There is no abdominal tenderness.   Musculoskeletal:         General: Tenderness present. No swelling.   Skin:     General: Skin is warm.      Findings: No erythema.   Neurological:      General: No focal deficit present.      Mental Status: He is alert and oriented to person, place, and time.         Fluids    Intake/Output Summary (Last 24 hours) at 4/18/2020 0741  Last data filed at 4/18/2020 0500  Gross per 24 hour   Intake --   Output 325 ml   Net -325 ml       Laboratory  Recent Labs     04/16/20 1900 04/18/20  0450   WBC 15.1* 9.4   RBC 4.47* 3.73*   HEMOGLOBIN 14.4 12.1*   HEMATOCRIT 42.0 35.3*   MCV 94.0 94.6   MCH 32.2 32.4   MCHC 34.3 34.3   RDW 44.0 45.5   PLATELETCT 120* 97*   MPV 10.7 11.0     Recent Labs     04/16/20 1900 04/18/20  0450   SODIUM 132* 136   POTASSIUM 4.3 3.8   CHLORIDE 100 104   CO2 19* 21   GLUCOSE 128* 102*   BUN 15 21   CREATININE 1.01 1.06   CALCIUM 9.1 8.5     Recent Labs     04/16/20 1900   INR 1.37*               Imaging  CT-ABDOMEN-PELVIS WITH   Final Result      Acute left parasymphyseal, inferior pubic ramus fractures      Right greater than left perivesicular, iliac fossa hemorrhage measures up to 5 cm long axis      Severe prostate enlargement with moderate bladder wall thickening indicating likely chronic outlet obstruction      Multichamber cardiac enlargement, medium-sized hiatal hernia, cholecystectomy, renal cysts, moderate colonic diverticulosis      CT-HEAD W/O   Final Result      No  noncontrast CT evidence of acute intracranial hemorrhage.      Moderate white matter hypodensity is present.  This is a nonspecific finding which usually is found to represent chronic microvascular disease in patient's of this demographic.  Demyelination, age indeterminant ischemia and gliosis are also common    possibilities.      Age-appropriate cerebral volume loss.      A wax plug abuts the left tympanic membrane      DX-HIP-UNILATERAL-WITH PELVIS-1 VIEW LEFT   Final Result      1.  No evidence of left proximal femoral or pelvic fracture.      2.  Old posttraumatic and surgical change of the right proximal femur.      DX-CHEST-PORTABLE (1 VIEW)   Final Result      1.  No acute cardiopulmonary abnormality.   2.  Stable cardiomegaly.           Assessment/Plan  * Pubic ramus fracture (HCC)  Assessment & Plan  Non-operative.  PT and OT evaluation, pain management as needed.    Ortho recommended nonsurgical treatment  SNF referral  Fall precaution    Essential hypertension- (present on admission)  Assessment & Plan  SBP around 150  Restarted losartan and carvedilol  Adjust medication as needed    Suspected COVID-19 virus infection  Assessment & Plan       Paroxysmal atrial fibrillation (HCC)  Assessment & Plan  Rate currently controlled.  Not currently on anticoagulation.  Monitor.      BPH (benign prostatic hyperplasia)- (present on admission)  Assessment & Plan  Controlled.  Monitor.        VTE prophylaxis: lovenox

## 2020-04-18 NOTE — PROGRESS NOTES
Pt off unit via hospital bed with transporter. Pt states personal belongings are in possession.  Chart given to transporter.

## 2020-04-19 PROBLEM — T14.8XXA HEMATOMA: Status: ACTIVE | Noted: 2020-04-19

## 2020-04-19 PROBLEM — D68.69 SECONDARY HYPERCOAGULABLE STATE (HCC): Status: ACTIVE | Noted: 2020-04-19

## 2020-04-19 PROBLEM — Z95.0 PACEMAKER: Status: ACTIVE | Noted: 2020-04-19

## 2020-04-19 PROBLEM — D64.9 NORMOCYTIC ANEMIA: Status: ACTIVE | Noted: 2020-04-19

## 2020-04-19 PROBLEM — I27.20 PULMONARY HYPERTENSION (HCC): Status: ACTIVE | Noted: 2020-04-19

## 2020-04-19 LAB
ANION GAP SERPL CALC-SCNC: 13 MMOL/L (ref 7–16)
BASOPHILS # BLD AUTO: 0.2 % (ref 0–1.8)
BASOPHILS # BLD: 0.02 K/UL (ref 0–0.12)
BUN SERPL-MCNC: 32 MG/DL (ref 8–22)
CALCIUM SERPL-MCNC: 8.1 MG/DL (ref 8.5–10.5)
CHLORIDE SERPL-SCNC: 102 MMOL/L (ref 96–112)
CO2 SERPL-SCNC: 20 MMOL/L (ref 20–33)
CREAT SERPL-MCNC: 1.59 MG/DL (ref 0.5–1.4)
EOSINOPHIL # BLD AUTO: 0.3 K/UL (ref 0–0.51)
EOSINOPHIL NFR BLD: 3.7 % (ref 0–6.9)
ERYTHROCYTE [DISTWIDTH] IN BLOOD BY AUTOMATED COUNT: 47.5 FL (ref 35.9–50)
GLUCOSE SERPL-MCNC: 175 MG/DL (ref 65–99)
HCT VFR BLD AUTO: 34.8 % (ref 42–52)
HGB BLD-MCNC: 11.8 G/DL (ref 14–18)
IMM GRANULOCYTES # BLD AUTO: 0.03 K/UL (ref 0–0.11)
IMM GRANULOCYTES NFR BLD AUTO: 0.4 % (ref 0–0.9)
LYMPHOCYTES # BLD AUTO: 1.25 K/UL (ref 1–4.8)
LYMPHOCYTES NFR BLD: 15.3 % (ref 22–41)
MCH RBC QN AUTO: 33.1 PG (ref 27–33)
MCHC RBC AUTO-ENTMCNC: 33.9 G/DL (ref 33.7–35.3)
MCV RBC AUTO: 97.5 FL (ref 81.4–97.8)
MONOCYTES # BLD AUTO: 0.64 K/UL (ref 0–0.85)
MONOCYTES NFR BLD AUTO: 7.8 % (ref 0–13.4)
NEUTROPHILS # BLD AUTO: 5.95 K/UL (ref 1.82–7.42)
NEUTROPHILS NFR BLD: 72.6 % (ref 44–72)
NRBC # BLD AUTO: 0 K/UL
NRBC BLD-RTO: 0 /100 WBC
PLATELET # BLD AUTO: 114 K/UL (ref 164–446)
PMV BLD AUTO: 11.3 FL (ref 9–12.9)
POTASSIUM SERPL-SCNC: 3.8 MMOL/L (ref 3.6–5.5)
PROCALCITONIN SERPL-MCNC: 0.05 NG/ML
RBC # BLD AUTO: 3.57 M/UL (ref 4.7–6.1)
SODIUM SERPL-SCNC: 135 MMOL/L (ref 135–145)
WBC # BLD AUTO: 8.2 K/UL (ref 4.8–10.8)

## 2020-04-19 PROCEDURE — A9270 NON-COVERED ITEM OR SERVICE: HCPCS | Performed by: INTERNAL MEDICINE

## 2020-04-19 PROCEDURE — 770006 HCHG ROOM/CARE - MED/SURG/GYN SEMI*

## 2020-04-19 PROCEDURE — 700102 HCHG RX REV CODE 250 W/ 637 OVERRIDE(OP): Performed by: INTERNAL MEDICINE

## 2020-04-19 PROCEDURE — 700111 HCHG RX REV CODE 636 W/ 250 OVERRIDE (IP): Performed by: HOSPITALIST

## 2020-04-19 PROCEDURE — A9270 NON-COVERED ITEM OR SERVICE: HCPCS | Performed by: HOSPITALIST

## 2020-04-19 PROCEDURE — 700102 HCHG RX REV CODE 250 W/ 637 OVERRIDE(OP): Performed by: HOSPITALIST

## 2020-04-19 PROCEDURE — 51798 US URINE CAPACITY MEASURE: CPT

## 2020-04-19 PROCEDURE — 84145 PROCALCITONIN (PCT): CPT

## 2020-04-19 PROCEDURE — 80048 BASIC METABOLIC PNL TOTAL CA: CPT

## 2020-04-19 PROCEDURE — 99232 SBSQ HOSP IP/OBS MODERATE 35: CPT | Performed by: HOSPITALIST

## 2020-04-19 PROCEDURE — 85025 COMPLETE CBC W/AUTO DIFF WBC: CPT

## 2020-04-19 PROCEDURE — 36415 COLL VENOUS BLD VENIPUNCTURE: CPT

## 2020-04-19 RX ORDER — TRAMADOL HYDROCHLORIDE 50 MG/1
50 TABLET ORAL
Status: ON HOLD | COMMUNITY
End: 2020-04-23

## 2020-04-19 RX ORDER — GABAPENTIN 100 MG/1
100 CAPSULE ORAL 3 TIMES DAILY
COMMUNITY

## 2020-04-19 RX ORDER — TAMSULOSIN HYDROCHLORIDE 0.4 MG/1
0.4 CAPSULE ORAL DAILY
COMMUNITY

## 2020-04-19 RX ADMIN — CARVEDILOL 25 MG: 25 TABLET, FILM COATED ORAL at 17:08

## 2020-04-19 RX ADMIN — SENNOSIDES AND DOCUSATE SODIUM 2 TABLET: 8.6; 5 TABLET ORAL at 17:09

## 2020-04-19 RX ADMIN — CARVEDILOL 25 MG: 25 TABLET, FILM COATED ORAL at 07:34

## 2020-04-19 RX ADMIN — ENOXAPARIN SODIUM 40 MG: 100 INJECTION SUBCUTANEOUS at 05:53

## 2020-04-19 RX ADMIN — LOSARTAN POTASSIUM 50 MG: 50 TABLET ORAL at 05:52

## 2020-04-19 RX ADMIN — SENNOSIDES AND DOCUSATE SODIUM 2 TABLET: 8.6; 5 TABLET ORAL at 05:51

## 2020-04-19 RX ADMIN — OXYCODONE HYDROCHLORIDE 5 MG: 5 TABLET ORAL at 17:08

## 2020-04-19 RX ADMIN — OXYCODONE HYDROCHLORIDE 5 MG: 5 TABLET ORAL at 05:52

## 2020-04-19 ASSESSMENT — PATIENT HEALTH QUESTIONNAIRE - PHQ9
2. FEELING DOWN, DEPRESSED, IRRITABLE, OR HOPELESS: NOT AT ALL
1. LITTLE INTEREST OR PLEASURE IN DOING THINGS: NOT AT ALL
SUM OF ALL RESPONSES TO PHQ9 QUESTIONS 1 AND 2: 0

## 2020-04-19 NOTE — ASSESSMENT & PLAN NOTE
Patient stated that he is taking blood thinner but unable to tell what he is he taking.       ----> After discussing with pharmacy it was noted that patient is not taking any anticoagulation.  He was discharged from anticoagulant clinic on 2/4/2020

## 2020-04-19 NOTE — ASSESSMENT & PLAN NOTE
Hx of  Patient is noted to have pulmonary hypertension on echo in 2016.  He is RSVP is noted to be 55.  He also does have severe tricuspid regurgitation.  -Possibly secondary to intrinsic pulmonary disease but patient has no respiratory symptoms and is not hypoxic

## 2020-04-19 NOTE — PROGRESS NOTES
Med rec complete with patient's pharmacy   Allergies reviewed  No Abx in last 30 days    Of note, patient stated over the phone that he is no longer taking coumadin which is in concordance with patient's discharge from the coumadin clinic per charting on 02/2020.  However patient is significantly altered and full understanding of medication regimen is unlikely.    Hermilo Howell, CharlesD, BCPS

## 2020-04-19 NOTE — ASSESSMENT & PLAN NOTE
Patient is noted to have iliac fossa hemorrhoids of 5 cm.  This is likely secondary to ground-level fall  -check QOD H/H, vitals are stable, no change on physical exam

## 2020-04-19 NOTE — PROGRESS NOTES
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  He does [facility].  He presented to the ER with a complaint of ground-level fall through the ago when he tripped and fell on the left side he reports that his fall is totally mechanical.  He stated that he did not have loss of consciousness, he did not hit his head.    Upon presentation in ER CT scan of the head did not show any acute intracranial abnormality.  CT abd/pelvis showed Acute left parasymphyseal, inferior pubic ramus fractures,   right greater than left periventricular, iliac fossa hemorrhage measuring up to 5 cm in long axis, severe BPH with moderate bladder wall thickening likely chronic outlet obstruction.  Orthopedic surgery Dr. Liu is consulted and patient is asked to be admitted by the hospitalist for further evaluation    The stay in the hospital patient continue to monitor on the medical surgical floor.  Patient is noted to have a fever on admission of 101.7.  Blood cultures x2 has been pending, procalcitonin normal, ESR 5, lactic acid 1.6 and 1.4, UA showing WBC of 2-5.  Unlikely infectious etiology, continue to monitor the patient clinically; no antibiotics indicated.    d     Interval Problem Update  No acute events overnight.  Patient noted to be sitting in a chair.  He is alert and oriented x2-3.  He reports that he had a fall on the left side and he is having pain on the left hip.  Morning vital sign is noted to be stable,  upon presentation he was noted to be hypertensive    --->He was afebrile overnight, presented with a fever of 101.7;  Blood cultures x2 has been pending, procalcitonin normal, ESR 5, lactic acid 1.6 and 1.4, UA showing WBC of 2-5.  Unlikely infectious etiology, continue to monitor the patient clinically; no antibiotics indicated.   ---> Of Note his COVID test is negative   ---> Not sure whether patient is on anticoagulation or not.  Per chart review he was discharged from anticoagulation clinic to start 4/2020.  He is currently on 40 mg of Lovenox, will continue to monitor hemoglobin hematocrit    Consultants/Specialty  [facility]    Code Status  full    Disposition  Likely skilled nursing facility but pending physical therapy and Occupational Therapy evaluation    Review of Systems  Review of Systems   Unable to perform ROS: Mental status change        Physical Exam  Temp:  [36.1 °C (97 °F)-37.3 °C (99.1 °F)] 37 °C (98.6 °F)  Pulse:  [60-71] 71  Resp:  [16-17] 17  BP: ()/(46-73) 106/62  SpO2:  [91 %-94 %] 91 %    Physical Exam  Constitutional:       Appearance: Normal appearance.   HENT:      Head: Normocephalic and atraumatic.   Eyes:      Extraocular Movements: Extraocular movements intact.   Neck:      Musculoskeletal: Neck supple.   Cardiovascular:      Rate and Rhythm: Normal rate.      Pulses: Normal pulses.      Comments: Pacemaker on the left side  Pulmonary:      Effort: Pulmonary effort is normal. No respiratory distress.      Breath sounds: Normal breath sounds.   Abdominal:      General: Abdomen is flat. Bowel sounds are normal. There is no distension.      Palpations: Abdomen is soft.   Musculoskeletal:      Comments: Range of motion in the left lower extremity is limited secondary to pain   Skin:     General: Skin is warm.   Neurological:      General: No focal deficit present.      Mental Status: He is alert.      Cranial Nerves: No cranial nerve deficit.      Comments: Alert and oriented x2-3          Fluids    Intake/Output Summary (Last 24 hours) at 4/19/2020 1321  Last data filed at 4/19/2020 0900  Gross per 24 hour   Intake 240 ml   Output --   Net 240 ml       Laboratory  Recent Labs     04/16/20 1900 04/18/20  0450   WBC 15.1* 9.4   RBC 4.47* 3.73*   HEMOGLOBIN 14.4 12.1*   HEMATOCRIT 42.0 35.3*   MCV 94.0 94.6   MCH 32.2 32.4   MCHC 34.3 34.3   RDW 44.0 45.5   PLATELETCT 120* 97*   MPV 10.7 11.0     Recent Labs     04/16/20 1900 04/18/20  0450   SODIUM 132* 136   POTASSIUM 4.3 3.8   CHLORIDE 100 104   CO2 19* 21   GLUCOSE 128* 102*   BUN 15 21   CREATININE 1.01 1.06   CALCIUM 9.1 8.5     Recent Labs     04/16/20 1900   INR 1.37*               Imaging  CT-ABDOMEN-PELVIS WITH   Final Result      Acute left parasymphyseal, inferior pubic ramus fractures      Right greater than left perivesicular, iliac fossa hemorrhage measures up to 5 cm long axis      Severe prostate enlargement with moderate bladder wall thickening indicating likely chronic outlet obstruction      Multichamber cardiac enlargement, medium-sized hiatal hernia, cholecystectomy, renal cysts, moderate colonic diverticulosis      CT-HEAD W/O   Final Result      No noncontrast CT evidence of acute intracranial hemorrhage.      Moderate white matter hypodensity is present.  This is a nonspecific finding which usually is found to represent chronic microvascular disease in patient's of this demographic.  Demyelination, age indeterminant ischemia and gliosis are also common    possibilities.      Age-appropriate cerebral volume loss.      A wax plug abuts the left tympanic membrane      DX-HIP-UNILATERAL-WITH PELVIS-1 VIEW LEFT   Final Result      1.  No evidence of left proximal femoral or pelvic fracture.      2.  Old posttraumatic and surgical change of the right proximal femur.      DX-CHEST-PORTABLE (1 VIEW)   Final Result      1.  No acute cardiopulmonary abnormality.   2.  Stable cardiomegaly.           Assessment/Plan  * Pubic  ramus fracture (HCC)  Assessment & Plan  Non-operative. Ortho recommended nonsurgical treatment  PT OT consult  Skilled referral has been placed  Provide Lovenox for DVT prophylaxis    Essential hypertension- (present on admission)  Assessment & Plan  Continue losartan and Coreg , BP well controlled    Pulmonary hypertension (HCC)  Assessment & Plan  Hx of  Patient is noted to have pulmonary hypertension on echo in 2016.  He is RSVP is noted to be 55.  He also does have severe tricuspid regurgitation.      Pacemaker  Assessment & Plan  Does have pacemaker placement.  Does follow Dr. Marshall as an outpatient    Iliac Fossa Hemorrhage   Assessment & Plan  Patient is noted to have iliac fossa hemorrhoids of 5 cm.  This is likely secondary to ground-level fall  We will monitor hemoglobin hematocrit on a daily basis    Secondary hypercoagulable state (HCC)  Assessment & Plan  Patient stated that he is taking blood thinner but unable to tell what he is he taking.    He has been discharged from anticoagulation clinic on February 2020  --- trying to obtain records    Suspected COVID-19 virus infection  Assessment & Plan  Covid test is negative     Paroxysmal atrial fibrillation (HCC)  Assessment & Plan  Rate currently controlled with coreg 25 mg po bid     BPH (benign prostatic hyperplasia)- (present on admission)  Assessment & Plan  Continue tamsulosin 0.4 mg po daily       VTE prophylaxis: Lovenox

## 2020-04-19 NOTE — PROGRESS NOTES
Pt. AOx4. Pain controlled w/ PRN meds. Up to the BR w/ 2 PA assist and FFW. VSS. Assessment completed. No s/s of distress. Safety maintained. Call light within reach. No issues or concerns. Will continue to monitor.

## 2020-04-20 PROBLEM — D69.6 THROMBOCYTOPENIA (HCC): Status: ACTIVE | Noted: 2020-04-20

## 2020-04-20 LAB
ALBUMIN SERPL BCP-MCNC: 3 G/DL (ref 3.2–4.9)
ALBUMIN/GLOB SERPL: 1.1 G/DL
ALP SERPL-CCNC: 76 U/L (ref 30–99)
ALT SERPL-CCNC: 12 U/L (ref 2–50)
ANION GAP SERPL CALC-SCNC: 12 MMOL/L (ref 7–16)
AST SERPL-CCNC: 19 U/L (ref 12–45)
BASOPHILS # BLD AUTO: 0.4 % (ref 0–1.8)
BASOPHILS # BLD: 0.03 K/UL (ref 0–0.12)
BILIRUB SERPL-MCNC: 0.9 MG/DL (ref 0.1–1.5)
BUN SERPL-MCNC: 31 MG/DL (ref 8–22)
CALCIUM SERPL-MCNC: 8.2 MG/DL (ref 8.5–10.5)
CHLORIDE SERPL-SCNC: 101 MMOL/L (ref 96–112)
CO2 SERPL-SCNC: 21 MMOL/L (ref 20–33)
CREAT SERPL-MCNC: 1.3 MG/DL (ref 0.5–1.4)
EOSINOPHIL # BLD AUTO: 0.25 K/UL (ref 0–0.51)
EOSINOPHIL NFR BLD: 3 % (ref 0–6.9)
ERYTHROCYTE [DISTWIDTH] IN BLOOD BY AUTOMATED COUNT: 46.9 FL (ref 35.9–50)
GLOBULIN SER CALC-MCNC: 2.8 G/DL (ref 1.9–3.5)
GLUCOSE SERPL-MCNC: 102 MG/DL (ref 65–99)
HCT VFR BLD AUTO: 36.2 % (ref 42–52)
HGB BLD-MCNC: 12.1 G/DL (ref 14–18)
IMM GRANULOCYTES # BLD AUTO: 0.02 K/UL (ref 0–0.11)
IMM GRANULOCYTES NFR BLD AUTO: 0.2 % (ref 0–0.9)
LYMPHOCYTES # BLD AUTO: 1.54 K/UL (ref 1–4.8)
LYMPHOCYTES NFR BLD: 18.8 % (ref 22–41)
MCH RBC QN AUTO: 32.4 PG (ref 27–33)
MCHC RBC AUTO-ENTMCNC: 33.4 G/DL (ref 33.7–35.3)
MCV RBC AUTO: 97.1 FL (ref 81.4–97.8)
MONOCYTES # BLD AUTO: 0.78 K/UL (ref 0–0.85)
MONOCYTES NFR BLD AUTO: 9.5 % (ref 0–13.4)
NEUTROPHILS # BLD AUTO: 5.58 K/UL (ref 1.82–7.42)
NEUTROPHILS NFR BLD: 68.1 % (ref 44–72)
NRBC # BLD AUTO: 0 K/UL
NRBC BLD-RTO: 0 /100 WBC
PLATELET # BLD AUTO: 118 K/UL (ref 164–446)
PMV BLD AUTO: 11.3 FL (ref 9–12.9)
POTASSIUM SERPL-SCNC: 3.8 MMOL/L (ref 3.6–5.5)
PROT SERPL-MCNC: 5.8 G/DL (ref 6–8.2)
RBC # BLD AUTO: 3.73 M/UL (ref 4.7–6.1)
SODIUM SERPL-SCNC: 134 MMOL/L (ref 135–145)
TSH SERPL DL<=0.005 MIU/L-ACNC: 3.88 UIU/ML (ref 0.38–5.33)
WBC # BLD AUTO: 8.2 K/UL (ref 4.8–10.8)

## 2020-04-20 PROCEDURE — 700111 HCHG RX REV CODE 636 W/ 250 OVERRIDE (IP): Performed by: HOSPITALIST

## 2020-04-20 PROCEDURE — 36415 COLL VENOUS BLD VENIPUNCTURE: CPT

## 2020-04-20 PROCEDURE — 80053 COMPREHEN METABOLIC PANEL: CPT

## 2020-04-20 PROCEDURE — 770006 HCHG ROOM/CARE - MED/SURG/GYN SEMI*

## 2020-04-20 PROCEDURE — 700102 HCHG RX REV CODE 250 W/ 637 OVERRIDE(OP): Performed by: HOSPITALIST

## 2020-04-20 PROCEDURE — A9270 NON-COVERED ITEM OR SERVICE: HCPCS | Performed by: INTERNAL MEDICINE

## 2020-04-20 PROCEDURE — 700102 HCHG RX REV CODE 250 W/ 637 OVERRIDE(OP): Performed by: INTERNAL MEDICINE

## 2020-04-20 PROCEDURE — 85025 COMPLETE CBC W/AUTO DIFF WBC: CPT

## 2020-04-20 PROCEDURE — A9270 NON-COVERED ITEM OR SERVICE: HCPCS | Performed by: HOSPITALIST

## 2020-04-20 PROCEDURE — 99232 SBSQ HOSP IP/OBS MODERATE 35: CPT | Performed by: HOSPITALIST

## 2020-04-20 PROCEDURE — 84443 ASSAY THYROID STIM HORMONE: CPT

## 2020-04-20 RX ADMIN — SENNOSIDES AND DOCUSATE SODIUM 2 TABLET: 8.6; 5 TABLET ORAL at 17:20

## 2020-04-20 RX ADMIN — OXYCODONE HYDROCHLORIDE 5 MG: 5 TABLET ORAL at 09:36

## 2020-04-20 RX ADMIN — CARVEDILOL 25 MG: 25 TABLET, FILM COATED ORAL at 09:33

## 2020-04-20 RX ADMIN — ENOXAPARIN SODIUM 40 MG: 100 INJECTION SUBCUTANEOUS at 05:13

## 2020-04-20 RX ADMIN — CARVEDILOL 25 MG: 25 TABLET, FILM COATED ORAL at 17:21

## 2020-04-20 RX ADMIN — SENNOSIDES AND DOCUSATE SODIUM 2 TABLET: 8.6; 5 TABLET ORAL at 05:13

## 2020-04-20 ASSESSMENT — COGNITIVE AND FUNCTIONAL STATUS - GENERAL
SUGGESTED CMS G CODE MODIFIER MOBILITY: CM
DAILY ACTIVITIY SCORE: 18
HELP NEEDED FOR BATHING: A LOT
TOILETING: A LOT
DRESSING REGULAR LOWER BODY CLOTHING: A LOT
CLIMB 3 TO 5 STEPS WITH RAILING: TOTAL
MOVING TO AND FROM BED TO CHAIR: UNABLE
TURNING FROM BACK TO SIDE WHILE IN FLAT BAD: UNABLE
WALKING IN HOSPITAL ROOM: TOTAL
STANDING UP FROM CHAIR USING ARMS: A LOT
SUGGESTED CMS G CODE MODIFIER DAILY ACTIVITY: CK
MOVING FROM LYING ON BACK TO SITTING ON SIDE OF FLAT BED: UNABLE
MOBILITY SCORE: 7

## 2020-04-20 ASSESSMENT — ENCOUNTER SYMPTOMS
DIZZINESS: 0
MYALGIAS: 1
HEARTBURN: 0
FEVER: 0
PALPITATIONS: 0
DEPRESSION: 0
COUGH: 0
BLURRED VISION: 0
CHILLS: 0
NAUSEA: 0
ORTHOPNEA: 0
HEADACHES: 0

## 2020-04-20 NOTE — PROGRESS NOTES
Pt is A&Ox4. Pt is resting in bed, no signs of labored breathing or pain. Pt on 2L via silicone NC. Call light & personal belongings within reach, bed in lowest position & locked, and bed alarm is on. Pt updated on plan of care for the day. Pt declines any additional needs at this time. Will continue to monitor.

## 2020-04-20 NOTE — PROGRESS NOTES
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  He does [facility].    He presented to the ER with a complaint of ground-level fall through the ago when he tripped and fell on the left side he reports that his fall is totally mechanical.  He stated that he did not have loss of consciousness, he did not hit his head.    Upon presentation in ER CT scan of the head did not show any acute intracranial abnormality.  CT abd/pelvis showed Acute left parasymphyseal, inferior pubic ramus fractures,right greater than left periventricular, iliac fossa hemorrhage measuring up to 5 cm in long axis, severe BPH with moderate bladder wall thickening likely chronic outlet obstruction. Orthopedic surgery Dr. Liu evaluated and recs no surgical intervention.    The stay in the hospital patient continue to monitor on the medical surgical floor.  Patient is noted to have a fever on admission of 101.7.  Blood cultures x2 has been pending, procalcitonin normal, ESR 5, lactic acid 1.6 and 1.4, UA showing WBC of 2-5.  Unlikely infectious etiology, continue to monitor the patient clinically; no antibiotics indicated. Of Note, patient is noted to be Covid -ve.    Also patient is not taking any anticoagulation for secondary hypercoagulable state.  He is a started on Lovenox 40 for DVT prophylaxis as per Ortho recommendation.  Patient does have iliac fossa hemorrhage up to 5 cm, will monitor H&H on a daily basis.         Interval  Problem Update  No acute events overnight. Patient is lying in bed.  He complained of the pain in the left hip.  He stated that he has difficulty in taking his left lower extremity     Consultants/Specialty   Dr Liu, orthopedic    Code Status  full    Disposition  Likely skilled nursing facility but pending physical therapy and Occupational Therapy evaluation    Review of Systems  Review of Systems   Unable to perform ROS: Mental status change   Constitutional: Negative for chills and fever.   Eyes: Negative for blurred vision.   Respiratory: Negative for cough.    Cardiovascular: Negative for chest pain, palpitations and orthopnea.   Gastrointestinal: Negative for heartburn and nausea.   Musculoskeletal: Positive for joint pain and myalgias.        Pain in the left hip   Neurological: Negative for dizziness and headaches.   Psychiatric/Behavioral: Negative for depression.        Physical Exam  Temp:  [36.4 °C (97.5 °F)-36.8 °C (98.2 °F)] 36.4 °C (97.5 °F)  Pulse:  [59-61] 61  Resp:  [14-18] 14  BP: ()/(51-61) 101/58  SpO2:  [88 %-93 %] 93 %    Physical Exam  Constitutional:       Appearance: Normal appearance.   HENT:      Head: Normocephalic and atraumatic.   Eyes:      Extraocular Movements: Extraocular movements intact.   Neck:      Musculoskeletal: Neck supple.   Cardiovascular:      Rate and Rhythm: Normal rate.      Pulses: Normal pulses.      Comments: Pacemaker on the left side  Pulmonary:      Effort: Pulmonary effort is normal. No respiratory distress.      Breath sounds: Normal breath sounds.   Abdominal:      General: Abdomen is flat. Bowel sounds are normal. There is no distension.      Palpations: Abdomen is soft.   Musculoskeletal:      Comments: Range of motion in the left lower extremity is limited secondary to pain   Skin:     General: Skin is warm.   Neurological:      General: No focal deficit present.      Mental Status: He is alert.      Cranial Nerves: No cranial nerve deficit.       Comments: Alert and oriented x2-3         Fluids    Intake/Output Summary (Last 24 hours) at 4/20/2020 1600  Last data filed at 4/20/2020 0000  Gross per 24 hour   Intake 320 ml   Output 400 ml   Net -80 ml       Laboratory  Recent Labs     04/18/20 0450 04/19/20 1942 04/20/20 0239   WBC 9.4 8.2 8.2   RBC 3.73* 3.57* 3.73*   HEMOGLOBIN 12.1* 11.8* 12.1*   HEMATOCRIT 35.3* 34.8* 36.2*   MCV 94.6 97.5 97.1   MCH 32.4 33.1* 32.4   MCHC 34.3 33.9 33.4*   RDW 45.5 47.5 46.9   PLATELETCT 97* 114* 118*   MPV 11.0 11.3 11.3     Recent Labs     04/18/20 0450 04/19/20 1942 04/20/20 0239   SODIUM 136 135 134*   POTASSIUM 3.8 3.8 3.8   CHLORIDE 104 102 101   CO2 21 20 21   GLUCOSE 102* 175* 102*   BUN 21 32* 31*   CREATININE 1.06 1.59* 1.30   CALCIUM 8.5 8.1* 8.2*                   Imaging  CT-ABDOMEN-PELVIS WITH   Final Result      Acute left parasymphyseal, inferior pubic ramus fractures      Right greater than left perivesicular, iliac fossa hemorrhage measures up to 5 cm long axis      Severe prostate enlargement with moderate bladder wall thickening indicating likely chronic outlet obstruction      Multichamber cardiac enlargement, medium-sized hiatal hernia, cholecystectomy, renal cysts, moderate colonic diverticulosis      CT-HEAD W/O   Final Result      No noncontrast CT evidence of acute intracranial hemorrhage.      Moderate white matter hypodensity is present.  This is a nonspecific finding which usually is found to represent chronic microvascular disease in patient's of this demographic.  Demyelination, age indeterminant ischemia and gliosis are also common    possibilities.      Age-appropriate cerebral volume loss.      A wax plug abuts the left tympanic membrane      DX-HIP-UNILATERAL-WITH PELVIS-1 VIEW LEFT   Final Result      1.  No evidence of left proximal femoral or pelvic fracture.      2.  Old posttraumatic and surgical change of the right proximal femur.      DX-CHEST-PORTABLE (1 VIEW)   Final  Result      1.  No acute cardiopulmonary abnormality.   2.  Stable cardiomegaly.           Assessment/Plan  * Pubic ramus fracture (HCC)  Assessment & Plan  Non-operative. Ortho recommended nonsurgical treatment  PT OT consult  Skilled referral has been placed  Provide Lovenox for DVT prophylaxis (discussed with ortho)    Essential hypertension- (present on admission)  Assessment & Plan  Continue losartan and Coreg , BP well controlled  (Does have history of CKD but on losartan at home,  Thus continued)    Thrombocytopenia (HCC)  Assessment & Plan  We will continue to monitor.  Please note that patient is an Lovenox for DVT prophylaxis    Pulmonary hypertension (HCC)  Assessment & Plan  Hx of  Patient is noted to have pulmonary hypertension on echo in 2016.  He is RSVP is noted to be 55.  He also does have severe tricuspid regurgitation.      Pacemaker  Assessment & Plan  Does have pacemaker placement.  Does follow Dr. Marshall as an outpatient    Iliac Fossa Hemorrhage   Assessment & Plan  Patient is noted to have iliac fossa hemorrhoids of 5 cm.  This is likely secondary to ground-level fall  We will monitor hemoglobin hematocrit on a daily basis    Normocytic anemia- (present on admission)  Assessment & Plan  Monitor hemoglobin hematocrit on a daily basis especially in the light of left iliac fossa hemorrhage    Secondary hypercoagulable state (HCC)  Assessment & Plan  Patient stated that he is taking blood thinner but unable to tell what he is he taking.       ----> After discussing with pharmacy it was noted that patient is not taking any anticoagulation.  He was discharged from anticoagulant clinic on 2/4/2020    Suspected COVID-19 virus infection  Assessment & Plan  Covid test is negative     Paroxysmal atrial fibrillation (HCC)  Assessment & Plan  Rate currently controlled with coreg 25 mg po bid     BPH (benign prostatic hyperplasia)- (present on admission)  Assessment & Plan  Continue tamsulosin 0.4 mg po  daily  Obtain bladder scan x1 to make sure that patient is not retaining       VTE prophylaxis: Lovenox

## 2020-04-20 NOTE — ASSESSMENT & PLAN NOTE
Monitor hemoglobin hematocrit on a daily basis especially in the light of left iliac fossa hemorrhage, remained stable again today

## 2020-04-20 NOTE — PROGRESS NOTES
Received bed side report. cna checked bp it was sbp 77. I did recheck on vitals sit patietn all the way up in bed to get a better number however, bp 88/51, hr 59, 86% on r. Air. I applied o2 nc got sats to 93%. I called to notify md on call for hospital medicine md marshall. He said he will order labs to see if patient is bleeding. I will continue to monitor as no nursing interventions ordered at this time.

## 2020-04-21 LAB
ANION GAP SERPL CALC-SCNC: 10 MMOL/L (ref 7–16)
BACTERIA BLD CULT: NORMAL
BACTERIA BLD CULT: NORMAL
BUN SERPL-MCNC: 20 MG/DL (ref 8–22)
CALCIUM SERPL-MCNC: 8.4 MG/DL (ref 8.5–10.5)
CHLORIDE SERPL-SCNC: 102 MMOL/L (ref 96–112)
CO2 SERPL-SCNC: 21 MMOL/L (ref 20–33)
CREAT SERPL-MCNC: 0.96 MG/DL (ref 0.5–1.4)
GLUCOSE SERPL-MCNC: 94 MG/DL (ref 65–99)
POTASSIUM SERPL-SCNC: 3.8 MMOL/L (ref 3.6–5.5)
SIGNIFICANT IND 70042: NORMAL
SIGNIFICANT IND 70042: NORMAL
SITE SITE: NORMAL
SITE SITE: NORMAL
SODIUM SERPL-SCNC: 133 MMOL/L (ref 135–145)
SOURCE SOURCE: NORMAL
SOURCE SOURCE: NORMAL

## 2020-04-21 PROCEDURE — A9270 NON-COVERED ITEM OR SERVICE: HCPCS | Performed by: INTERNAL MEDICINE

## 2020-04-21 PROCEDURE — 700102 HCHG RX REV CODE 250 W/ 637 OVERRIDE(OP): Performed by: HOSPITALIST

## 2020-04-21 PROCEDURE — 99498 ADVNCD CARE PLAN ADDL 30 MIN: CPT | Performed by: INTERNAL MEDICINE

## 2020-04-21 PROCEDURE — 99232 SBSQ HOSP IP/OBS MODERATE 35: CPT | Performed by: INTERNAL MEDICINE

## 2020-04-21 PROCEDURE — 99497 ADVNCD CARE PLAN 30 MIN: CPT | Performed by: INTERNAL MEDICINE

## 2020-04-21 PROCEDURE — 700102 HCHG RX REV CODE 250 W/ 637 OVERRIDE(OP): Performed by: INTERNAL MEDICINE

## 2020-04-21 PROCEDURE — 36415 COLL VENOUS BLD VENIPUNCTURE: CPT

## 2020-04-21 PROCEDURE — 80048 BASIC METABOLIC PNL TOTAL CA: CPT

## 2020-04-21 PROCEDURE — 770006 HCHG ROOM/CARE - MED/SURG/GYN SEMI*

## 2020-04-21 PROCEDURE — A9270 NON-COVERED ITEM OR SERVICE: HCPCS | Performed by: HOSPITALIST

## 2020-04-21 PROCEDURE — 700111 HCHG RX REV CODE 636 W/ 250 OVERRIDE (IP): Performed by: HOSPITALIST

## 2020-04-21 RX ADMIN — LOSARTAN POTASSIUM 50 MG: 50 TABLET ORAL at 04:01

## 2020-04-21 RX ADMIN — CARVEDILOL 25 MG: 25 TABLET, FILM COATED ORAL at 09:02

## 2020-04-21 RX ADMIN — SENNOSIDES AND DOCUSATE SODIUM 2 TABLET: 8.6; 5 TABLET ORAL at 16:52

## 2020-04-21 RX ADMIN — CARVEDILOL 25 MG: 25 TABLET, FILM COATED ORAL at 16:52

## 2020-04-21 RX ADMIN — ENOXAPARIN SODIUM 40 MG: 100 INJECTION SUBCUTANEOUS at 04:01

## 2020-04-21 ASSESSMENT — ENCOUNTER SYMPTOMS
HEADACHES: 0
DEPRESSION: 0
ABDOMINAL PAIN: 0
VOMITING: 0
COUGH: 0
DIZZINESS: 0
NAUSEA: 0
BLURRED VISION: 0
MYALGIAS: 1
FEVER: 0

## 2020-04-21 NOTE — PALLIATIVE CARE
Palliative Care follow-up  Received call from pts son, Andrea, who stated that his sister declined to be the pts POA and that they would like to defer to the oldest son. Provided his name and contact number, Des nialeydi Pineda, #[phone].     Updated: Dr Peter    Thank you for allowing Palliative Care to support this patient and family. Contact [phone] for additional assistance, change in patient status, or with any questions/concerns.

## 2020-04-21 NOTE — CONSULTS
"Reason for Palliative Care Consult: Advance Care Planning    Consulted by: Jose Frederick MD    HPI: The patient is a 89 yo  M admitted 4/17/20 with increasing left hip pain after a GLF from tripping that occurred three days PTA. He was found to have a pubic ramus fx and iliac fossa hemorrhage measures up to 5 cm long axis. 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    Additional consults:   Orthopedic surgery: Luis Liu M.D.    Assessment:  Neuro: A & O x 3  Dyspnea: Yes-    Last BM: 04/20/20-    Pain: Yes- (DT fracture)  Depression: Mood appropriate for situation-    Dementia: No;       Living situation & psychosocial: The patient lives alone in [place] and reports that he was able to do all of his cooking, bathing and cleaning prior to this admission. He has 7 sons and 2 daughters. His eldest son Des Keller (nicknamleydi Pineda) is very involved in  Checking up on him and his Eldest daughter Manuela Reynolds is most aware of his medical history.    Spiritual:  Is Sikhism or spirituality important for coping with this illness?  -    Has a  or spiritual provider visit been requested?      Palliative Performance Scale: 50% (Prior to hospitalizaton at least 70%)%    Advance Directive: None-    DPOA:  -    POLST: No-      Code Status: Full-      Outcome:  Met with Mr Keller at his bedside with charge nurse Liza who was able to act a . The patients' English is actually very good but he wished for an  to discuss \"the difficult questions\"  He noted that his wife passed away about three years ago. She was in the hospital with a heart problem but the doctors said there was nothing they could do to " "fix it and she passed at home. I did address code status with him describing the process in detail and he repeatedly said \"do what you have to do\". I was clearly difficult for him to go into any more detail so I asked him which of his nine children would be best to ask medical questions of if he could not speak for himself and he said Manuela his eldest daughter. He noted that Des his eldest son is also very involved in helping him and could do it also. We completed the AD making Manuela Reynolds ([phone]) his primary DPOA and Des Keller ([phone]) his first alternate. Mr Krishna said that his children will not argue with one another as they all get along. I attempted to call Manuela but there was no answer and no space on voice mail. I was able to contact his son Andrea who lives in [place] and ultimately was able to speak to the eldest son Des. Both were very pleasant and supportive that Manuela be the primary DPOA and Des will support her. Both sons see their father as very strong and agree with the full code status for now. They will communicate with their sister regarding our conversations.  Of note is the the ppatients grandfather lived to 109 yo.    Plan: Notarize and scan AD/DPOA into medical records  Continue full code status  Notify family when SNF for rehab established.    Recommendations: I do not recommend an ethics or hospice consult at this time because Not indicated..        Thank you for allowing Palliative Care to participate in this patient's care. Please call our team with questions and/or additional needs.    Total visit time was 55 minutes discussing advance care planning with the patient and several family members..         "

## 2020-04-21 NOTE — PROGRESS NOTES
Pt is 2x assist up to commode. C/o pain in left hip, preferred to rest. All needs are currently met. Bed alarm on. Room by nurses' station.

## 2020-04-21 NOTE — PROGRESS NOTES
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  He does follow [facility] Marshall [facility].    He presented to the ER with a complaint of ground-level fall through the ago when he tripped and fell on the left side he reports that his fall is totally mechanical.  He stated that he did not have loss of consciousness, he did not hit his head.    Upon presentation in ER CT scan of the head did not show any acute intracranial abnormality.  CT abd/pelvis showed Acute left parasymphyseal, inferior pubic ramus fractures,right greater than left periventricular, iliac fossa hemorrhage measuring up to 5 cm in long axis, severe BPH with moderate bladder wall thickening likely chronic outlet obstruction. Orthopedic surgery Dr. Liu evaluated and recs no surgical intervention.    The stay in the hospital patient continue to monitor on the medical surgical floor.  Patient is noted to have a fever on admission of 101.7.  Blood cultures x2 has been pending, procalcitonin normal, ESR 5, lactic acid 1.6 and 1.4, UA showing WBC of 2-5.  Unlikely infectious etiology, continue to monitor the patient clinically; no antibiotics indicated. Of Note, patient is noted to be Covid -ve.    Also patient is not taking any anticoagulation for secondary hypercoagulable state.  He is a started on Lovenox 40 for DVT prophylaxis as per Ortho recommendation.  Patient does have iliac fossa hemorrhage up to 5 cm, will monitor H&H on a daily basis.         Interval  Problem Update  Multiple fractures-patient states pain is 5 out of 10.  Hemoglobin remained stable no new numbness or weakness.    Consultants/Specialty   Dr Liu, orthopedic  Consulted palliative care for CODE STATUS discussion is full code    Code Status  full    Disposition  Likely skilled nursing facility but pending physical therapy and Occupational Therapy evaluation    Review of Systems  Review of Systems   Constitutional: Negative for fever.   Eyes: Negative for blurred vision.   Respiratory: Negative for cough.    Cardiovascular: Negative for chest pain.   Gastrointestinal: Negative for abdominal pain, nausea and vomiting.   Musculoskeletal: Positive for joint pain and myalgias.        Pain in the left hip-decently controlled today   Neurological: Negative for dizziness and headaches.   Psychiatric/Behavioral: Negative for depression.   All other systems reviewed and are negative.       Physical Exam  Temp:  [36.4 °C (97.5 °F)-37.1 °C (98.8 °F)] 36.4 °C (97.5 °F)  Pulse:  [60-63] 60  Resp:  [16-19] 18  BP: ()/(57-78) 124/66  SpO2:  [94 %-96 %] 96 %    Physical Exam  Vitals signs and nursing note reviewed.   Constitutional:       Appearance: Normal appearance.      Comments: Mostly Citizen of [place]-speaking   HENT:      Head: Normocephalic and atraumatic.   Eyes:      General:         Right eye: No discharge.         Left eye: No discharge.      Extraocular Movements: Extraocular movements intact.   Neck:      Musculoskeletal: Neck supple.   Cardiovascular:      Rate and Rhythm: Normal rate.      Pulses: Normal pulses.      Comments: Pacemaker on the left side  Pulmonary:      Effort: Pulmonary effort is normal. No respiratory distress.      Breath sounds: Normal breath sounds.   Abdominal:      General: Abdomen is flat. Bowel sounds are normal.      Palpations: Abdomen is soft.      Tenderness: There is no abdominal tenderness.   Musculoskeletal:      Comments: Range of motion in the left lower extremity is  limited secondary to pain  Cachectic limb   Skin:     General: Skin is warm.   Neurological:      General: No focal deficit present.      Mental Status: He is alert and oriented to person, place, and time.      Cranial Nerves: No cranial nerve deficit.         Fluids    Intake/Output Summary (Last 24 hours) at 4/21/2020 1102  Last data filed at 4/21/2020 0351  Gross per 24 hour   Intake 1100 ml   Output 500 ml   Net 600 ml       Laboratory  Recent Labs     04/19/20 1942 04/20/20  0239   WBC 8.2 8.2   RBC 3.57* 3.73*   HEMOGLOBIN 11.8* 12.1*   HEMATOCRIT 34.8* 36.2*   MCV 97.5 97.1   MCH 33.1* 32.4   MCHC 33.9 33.4*   RDW 47.5 46.9   PLATELETCT 114* 118*   MPV 11.3 11.3     Recent Labs     04/19/20 1942 04/20/20 0239 04/21/20  0811   SODIUM 135 134* 133*   POTASSIUM 3.8 3.8 3.8   CHLORIDE 102 101 102   CO2 20 21 21   GLUCOSE 175* 102* 94   BUN 32* 31* 20   CREATININE 1.59* 1.30 0.96   CALCIUM 8.1* 8.2* 8.4*                   Imaging  CT-ABDOMEN-PELVIS WITH   Final Result      Acute left parasymphyseal, inferior pubic ramus fractures      Right greater than left perivesicular, iliac fossa hemorrhage measures up to 5 cm long axis      Severe prostate enlargement with moderate bladder wall thickening indicating likely chronic outlet obstruction      Multichamber cardiac enlargement, medium-sized hiatal hernia, cholecystectomy, renal cysts, moderate colonic diverticulosis      CT-HEAD W/O   Final Result      No noncontrast CT evidence of acute intracranial hemorrhage.      Moderate white matter hypodensity is present.  This is a nonspecific finding which usually is found to represent chronic microvascular disease in patient's of this demographic.  Demyelination, age indeterminant ischemia and gliosis are also common    possibilities.      Age-appropriate cerebral volume loss.      A wax plug abuts the left tympanic membrane      DX-HIP-UNILATERAL-WITH PELVIS-1 VIEW LEFT   Final Result      1.  No evidence of left  proximal femoral or pelvic fracture.      2.  Old posttraumatic and surgical change of the right proximal femur.      DX-CHEST-PORTABLE (1 VIEW)   Final Result      1.  No acute cardiopulmonary abnormality.   2.  Stable cardiomegaly.           Assessment/Plan  * Pubic ramus fracture (HCC)- (present on admission)  Assessment & Plan  Non-operative. Ortho recommended nonsurgical treatment  PT OT consult  Skilled referral has been placed-working with case management on disposition  Provide Lovenox for DVT prophylaxis (discussed with ortho)-hemoglobin remained stable  -Pain is well controlled continue multimodal pain therapy    Essential hypertension- (present on admission)  Assessment & Plan  Continue losartan and Coreg , BP well controlled  (Does have history of CKD but on losartan at home,  Thus continued)    Thrombocytopenia (HCC)- (present on admission)  Assessment & Plan  We will continue to monitor.  Please note that patient is an Lovenox for DVT prophylaxis    Pulmonary hypertension (HCC)- (present on admission)  Assessment & Plan  Hx of  Patient is noted to have pulmonary hypertension on echo in 2016.  He is RSVP is noted to be 55.  He also does have severe tricuspid regurgitation.  -Possibly secondary to intrinsic pulmonary disease but patient has no respiratory symptoms and is not hypoxic      Pacemaker- (present on admission)  Assessment & Plan  Does have pacemaker placement.  Does follow Dr. Marshall as an outpatient    Iliac Fossa Hemorrhage - (present on admission)  Assessment & Plan  Patient is noted to have iliac fossa hemorrhoids of 5 cm.  This is likely secondary to ground-level fall  -Hemoglobin and hematocrit remained stable, no change on physical exam, check daily CBC    Normocytic anemia- (present on admission)  Assessment & Plan  Monitor hemoglobin hematocrit on a daily basis especially in the light of left iliac fossa hemorrhage    Secondary hypercoagulable state (HCC)- (present on  admission)  Assessment & Plan  Patient stated that he is taking blood thinner but unable to tell what he is he taking.       ----> After discussing with pharmacy it was noted that patient is not taking any anticoagulation.  He was discharged from anticoagulant clinic on 2/4/2020    Suspected COVID-19 virus infection  Assessment & Plan  Covid test is negative     Paroxysmal atrial fibrillation (HCC)- (present on admission)  Assessment & Plan  Rate currently controlled with coreg 25 mg po bid   -As noted above no anticoagulation which is reasonable given age of 89    BPH (benign prostatic hyperplasia)- (present on admission)  Assessment & Plan  Continue tamsulosin 0.4 mg po daily  -Cr is on labs       VTE prophylaxis: Lovenox

## 2020-04-21 NOTE — PROGRESS NOTES
Pt is A&Ox4. Pt is resting in bed, no signs of labored breathing or pain. Pt on RA. Call light & personal belongings within reach, bed in lowest position & locked, and bed alarm is on. Pt updated on plan of care for the day. Pt declines any additional needs at this time. Will continue to monitor.

## 2020-04-22 LAB
BASOPHILS # BLD AUTO: 0.3 % (ref 0–1.8)
BASOPHILS # BLD: 0.02 K/UL (ref 0–0.12)
EOSINOPHIL # BLD AUTO: 0.16 K/UL (ref 0–0.51)
EOSINOPHIL NFR BLD: 2.7 % (ref 0–6.9)
ERYTHROCYTE [DISTWIDTH] IN BLOOD BY AUTOMATED COUNT: 46.5 FL (ref 35.9–50)
HCT VFR BLD AUTO: 37.8 % (ref 42–52)
HGB BLD-MCNC: 12.3 G/DL (ref 14–18)
IMM GRANULOCYTES # BLD AUTO: 0.02 K/UL (ref 0–0.11)
IMM GRANULOCYTES NFR BLD AUTO: 0.3 % (ref 0–0.9)
LYMPHOCYTES # BLD AUTO: 1.45 K/UL (ref 1–4.8)
LYMPHOCYTES NFR BLD: 24.5 % (ref 22–41)
MCH RBC QN AUTO: 32.1 PG (ref 27–33)
MCHC RBC AUTO-ENTMCNC: 32.5 G/DL (ref 33.7–35.3)
MCV RBC AUTO: 98.7 FL (ref 81.4–97.8)
MONOCYTES # BLD AUTO: 0.66 K/UL (ref 0–0.85)
MONOCYTES NFR BLD AUTO: 11.1 % (ref 0–13.4)
NEUTROPHILS # BLD AUTO: 3.61 K/UL (ref 1.82–7.42)
NEUTROPHILS NFR BLD: 61.1 % (ref 44–72)
NRBC # BLD AUTO: 0 K/UL
NRBC BLD-RTO: 0 /100 WBC
PLATELET # BLD AUTO: 129 K/UL (ref 164–446)
PMV BLD AUTO: 11 FL (ref 9–12.9)
RBC # BLD AUTO: 3.83 M/UL (ref 4.7–6.1)
WBC # BLD AUTO: 5.9 K/UL (ref 4.8–10.8)

## 2020-04-22 PROCEDURE — 700102 HCHG RX REV CODE 250 W/ 637 OVERRIDE(OP): Performed by: INTERNAL MEDICINE

## 2020-04-22 PROCEDURE — 36415 COLL VENOUS BLD VENIPUNCTURE: CPT

## 2020-04-22 PROCEDURE — 97530 THERAPEUTIC ACTIVITIES: CPT

## 2020-04-22 PROCEDURE — A9270 NON-COVERED ITEM OR SERVICE: HCPCS | Performed by: INTERNAL MEDICINE

## 2020-04-22 PROCEDURE — 99232 SBSQ HOSP IP/OBS MODERATE 35: CPT | Performed by: INTERNAL MEDICINE

## 2020-04-22 PROCEDURE — A9270 NON-COVERED ITEM OR SERVICE: HCPCS | Performed by: HOSPITALIST

## 2020-04-22 PROCEDURE — 700111 HCHG RX REV CODE 636 W/ 250 OVERRIDE (IP): Performed by: HOSPITALIST

## 2020-04-22 PROCEDURE — 85025 COMPLETE CBC W/AUTO DIFF WBC: CPT

## 2020-04-22 PROCEDURE — 97535 SELF CARE MNGMENT TRAINING: CPT

## 2020-04-22 PROCEDURE — 770006 HCHG ROOM/CARE - MED/SURG/GYN SEMI*

## 2020-04-22 PROCEDURE — 700102 HCHG RX REV CODE 250 W/ 637 OVERRIDE(OP): Performed by: HOSPITALIST

## 2020-04-22 RX ADMIN — SENNOSIDES AND DOCUSATE SODIUM 2 TABLET: 8.6; 5 TABLET ORAL at 05:07

## 2020-04-22 RX ADMIN — ENOXAPARIN SODIUM 40 MG: 100 INJECTION SUBCUTANEOUS at 05:07

## 2020-04-22 RX ADMIN — CARVEDILOL 25 MG: 25 TABLET, FILM COATED ORAL at 09:30

## 2020-04-22 RX ADMIN — LOSARTAN POTASSIUM 50 MG: 50 TABLET ORAL at 05:07

## 2020-04-22 RX ADMIN — SENNOSIDES AND DOCUSATE SODIUM 2 TABLET: 8.6; 5 TABLET ORAL at 22:32

## 2020-04-22 RX ADMIN — OXYCODONE HYDROCHLORIDE 5 MG: 5 TABLET ORAL at 16:11

## 2020-04-22 ASSESSMENT — ENCOUNTER SYMPTOMS
MYALGIAS: 1
DEPRESSION: 0
DIZZINESS: 0
BLURRED VISION: 0
HEADACHES: 0
DIARRHEA: 0
FEVER: 0
COUGH: 0

## 2020-04-22 ASSESSMENT — COGNITIVE AND FUNCTIONAL STATUS - GENERAL
MOVING TO AND FROM BED TO CHAIR: UNABLE
SUGGESTED CMS G CODE MODIFIER DAILY ACTIVITY: CK
DRESSING REGULAR LOWER BODY CLOTHING: A LOT
TURNING FROM BACK TO SIDE WHILE IN FLAT BAD: UNABLE
HELP NEEDED FOR BATHING: A LOT
CLIMB 3 TO 5 STEPS WITH RAILING: TOTAL
WALKING IN HOSPITAL ROOM: A LOT
DAILY ACTIVITIY SCORE: 19
MOVING FROM LYING ON BACK TO SITTING ON SIDE OF FLAT BED: UNABLE
MOBILITY SCORE: 8
SUGGESTED CMS G CODE MODIFIER MOBILITY: CM
STANDING UP FROM CHAIR USING ARMS: A LOT
TOILETING: A LITTLE

## 2020-04-22 ASSESSMENT — GAIT ASSESSMENTS
GAIT LEVEL OF ASSIST: MODERATE ASSIST
DISTANCE (FEET): 3
ASSISTIVE DEVICE: FRONT WHEEL WALKER
DEVIATION: DECREASED HEEL STRIKE;DECREASED TOE OFF;DECREASED BASE OF SUPPORT

## 2020-04-22 NOTE — DISCHARGE PLANNING
Received Choice form at 2020  Agency/Facility Name: [facility]  Referral sent per Choice form @ [id number]

## 2020-04-22 NOTE — PROGRESS NOTES
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  He does [facility] Marshall [facility].    He presented to the ER with a complaint of ground-level fall through the ago when he tripped and fell on the left side he reports that his fall is totally mechanical.  He stated that he did not have loss of consciousness, he did not hit his head.    Upon presentation in ER CT scan of the head did not show any acute intracranial abnormality.  CT abd/pelvis showed Acute left parasymphyseal, inferior pubic ramus fractures,right greater than left periventricular, iliac fossa hemorrhage measuring up to 5 cm in long axis, severe BPH with moderate bladder wall thickening likely chronic outlet obstruction. Orthopedic surgery Dr. Liu evaluated and recs no surgical intervention.    The stay in the hospital patient continue to monitor on the medical surgical floor.  Patient is noted to have a fever on admission of 101.7.  Blood cultures x2 has been pending, procalcitonin normal, ESR 5, lactic acid 1.6 and 1.4, UA showing WBC of 2-5.  Unlikely infectious etiology, continue to monitor the patient clinically; no antibiotics indicated. Of Note, patient is noted to be Covid -ve.    Also patient is not taking any anticoagulation for secondary hypercoagulable state.  He is a started on Lovenox 40 for DVT prophylaxis as per Ortho recommendation.  Patient does have iliac fossa hemorrhage up to 5 cm, will monitor H&H on a daily basis.         Interval  Problem Update  Multiple fractures-pain remains at about a 5-6 out of 10 but is tolerable to patient.  He still has some weakness associated pain in the left leg but none on the right.  Denies any nausea vomiting no bleeding issues and denies any dizziness or lightheadedness.  Acute rehab declined patient.    Consultants/Specialty   Dr Liu, orthopedic  Consulted palliative care for CODE STATUS discussion is full code    Code Status  full    Disposition  Likely skilled nursing facility but pending physical therapy and Occupational Therapy evaluation    Review of Systems  Review of Systems   Constitutional: Negative for fever.   Eyes: Negative for blurred vision.   Respiratory: Negative for cough.    Cardiovascular: Negative for chest pain.   Gastrointestinal: Negative for diarrhea.   Musculoskeletal: Positive for joint pain and myalgias.        Pain in the left hip-pain remains decently controlled and tolerable   Neurological: Negative for dizziness and headaches.   Psychiatric/Behavioral: Negative for depression.   All other systems reviewed and are negative.       Physical Exam  Temp:  [36.2 °C (97.1 °F)-36.4 °C (97.5 °F)] 36.4 °C (97.5 °F)  Pulse:  [60-79] 60  Resp:  [15-19] 19  BP: (109-142)/(66-85) 142/71  SpO2:  [98 %-100 %] 98 %    Physical Exam  Vitals signs and nursing note reviewed.   Constitutional:       Appearance: Normal appearance.      Comments: Mostly Croatian-speaking  Appears comfortable and younger than stated age   HENT:      Head: Normocephalic and atraumatic.   Eyes:      General: No scleral icterus.     Extraocular Movements: Extraocular movements intact.   Neck:      Musculoskeletal: Neck supple.   Cardiovascular:      Rate and Rhythm: Normal rate.      Pulses: Normal pulses.      Comments: Pacemaker on the left side  Pulmonary:      Effort: Pulmonary effort is normal.      Breath sounds: Normal breath sounds. No wheezing.   Abdominal:      General: Abdomen is flat. Bowel sounds are normal.  There is no distension.      Palpations: Abdomen is soft.   Musculoskeletal:      Comments: Range of motion in the left lower extremity is limited secondary to pain  Cachectic limb  Warm peripherally 2+ DP pulses equal and symmetric   Skin:     General: Skin is warm.   Neurological:      General: No focal deficit present.      Mental Status: He is alert and oriented to person, place, and time.      Cranial Nerves: No cranial nerve deficit.         Fluids    Intake/Output Summary (Last 24 hours) at 4/22/2020 1245  Last data filed at 4/22/2020 0930  Gross per 24 hour   Intake 600 ml   Output 650 ml   Net -50 ml       Laboratory  Recent Labs     04/19/20 1942 04/20/20 0239 04/22/20  0210   WBC 8.2 8.2 5.9   RBC 3.57* 3.73* 3.83*   HEMOGLOBIN 11.8* 12.1* 12.3*   HEMATOCRIT 34.8* 36.2* 37.8*   MCV 97.5 97.1 98.7*   MCH 33.1* 32.4 32.1   MCHC 33.9 33.4* 32.5*   RDW 47.5 46.9 46.5   PLATELETCT 114* 118* 129*   MPV 11.3 11.3 11.0     Recent Labs     04/19/20 1942 04/20/20 0239 04/21/20  0811   SODIUM 135 134* 133*   POTASSIUM 3.8 3.8 3.8   CHLORIDE 102 101 102   CO2 20 21 21   GLUCOSE 175* 102* 94   BUN 32* 31* 20   CREATININE 1.59* 1.30 0.96   CALCIUM 8.1* 8.2* 8.4*                   Imaging  CT-ABDOMEN-PELVIS WITH   Final Result      Acute left parasymphyseal, inferior pubic ramus fractures      Right greater than left perivesicular, iliac fossa hemorrhage measures up to 5 cm long axis      Severe prostate enlargement with moderate bladder wall thickening indicating likely chronic outlet obstruction      Multichamber cardiac enlargement, medium-sized hiatal hernia, cholecystectomy, renal cysts, moderate colonic diverticulosis      CT-HEAD W/O   Final Result      No noncontrast CT evidence of acute intracranial hemorrhage.      Moderate white matter hypodensity is present.  This is a nonspecific finding which usually is found to represent chronic microvascular disease in patient's of this demographic.  Demyelination,  age indeterminant ischemia and gliosis are also common    possibilities.      Age-appropriate cerebral volume loss.      A wax plug abuts the left tympanic membrane      DX-HIP-UNILATERAL-WITH PELVIS-1 VIEW LEFT   Final Result      1.  No evidence of left proximal femoral or pelvic fracture.      2.  Old posttraumatic and surgical change of the right proximal femur.      DX-CHEST-PORTABLE (1 VIEW)   Final Result      1.  No acute cardiopulmonary abnormality.   2.  Stable cardiomegaly.           Assessment/Plan  * Pubic ramus fracture (HCC)- (present on admission)  Assessment & Plan  Non-operative. Ortho recommended nonsurgical treatment  PT OT consult  Skilled referral has been placed-working with case management on disposition  Provide Lovenox for DVT prophylaxis (discussed with ortho)-hemoglobin remained stable  -Pain is well controlled continue multimodal pain therapy    Essential hypertension- (present on admission)  Assessment & Plan  Continue losartan and Coreg , BP well controlled  (Does have history of CKD but on losartan at home,  Thus continued)    Thrombocytopenia (HCC)- (present on admission)  Assessment & Plan  We will continue to monitor.  Please note that patient is an Lovenox for DVT prophylaxis    Pulmonary hypertension (HCC)- (present on admission)  Assessment & Plan  Hx of  Patient is noted to have pulmonary hypertension on echo in 2016.  He is RSVP is noted to be 55.  He also does have severe tricuspid regurgitation.  -Possibly secondary to intrinsic pulmonary disease but patient has no respiratory symptoms and is not hypoxic      Pacemaker- (present on admission)  Assessment & Plan  Does have pacemaker placement.  Does follow Dr. Marshall as an outpatient    Iliac Fossa Hemorrhage - (present on admission)  Assessment & Plan  Patient is noted to have iliac fossa hemorrhoids of 5 cm.  This is likely secondary to ground-level fall  -Hemoglobin and hematocrit remained stable, no change on physical  exam, check daily CBC    Normocytic anemia- (present on admission)  Assessment & Plan  Monitor hemoglobin hematocrit on a daily basis especially in the light of left iliac fossa hemorrhage, remained stable again today    Secondary hypercoagulable state (HCC)- (present on admission)  Assessment & Plan  Patient stated that he is taking blood thinner but unable to tell what he is he taking.       ----> After discussing with pharmacy it was noted that patient is not taking any anticoagulation.  He was discharged from anticoagulant clinic on 2/4/2020    Suspected COVID-19 virus infection  Assessment & Plan  Covid test is negative     Paroxysmal atrial fibrillation (HCC)- (present on admission)  Assessment & Plan  Rate currently controlled with coreg 25 mg po bid   -As noted above no anticoagulation which is reasonable given age of 89    BPH (benign prostatic hyperplasia)- (present on admission)  Assessment & Plan  Continue tamsulosin 0.4 mg po daily  -Cr is on labs       VTE prophylaxis: Lovenox

## 2020-04-22 NOTE — PROGRESS NOTES
Pt is A&Ox4. Pt is resting in bed, no signs of labored breathing or pain. Pt on 2L via silicone NC with gray foam padding. Call light & personal belongings within reach, bed in lowest position & locked, and bed alarm is on. Urinal and commode at bedside. Fall precautions in place and gait is steady. Pt educated to call for assistance. Pt updated on plan of care for the day. Pt declines any additional needs at this time. Will continue to monitor.

## 2020-04-22 NOTE — DISCHARGE PLANNING
[facility] Transitional [facility] Coordination     Referral from: Dr. Frederick    Facesheet indicates: Medicare HMO   Potential Rehab Diagnosis: Minimally displaced left anterior pelvic ring injury and sacral ala fracture     Hospital day 5 limited tolerance to therapy intervention anticipate skilled nursing when medically cleared.        Physiatry consultation denied  per protocol.        RRH not a benefit of the provider.       Thank you for the referral.

## 2020-04-22 NOTE — DISCHARGE PLANNING
CM attempted to contact pt in room and on contact cell phone number but unable to reach at this time. Chart reviewed and [facility] contact P[facility]o noted in PC note. CM contacted [facility] for SNF Choice. He chose the followin. [facility]  2.  [facility]  3. [facility]  4.  [facility] faxed to [facility] #[phone]

## 2020-04-22 NOTE — THERAPY
"Pt seen for PT treatment session, presented sitting EOB with OT. Pt able to initiate small steps at EOB with Mod A. Ambulation tolerance limited by pain, RN reported pt has not received pain medication since yesterday. Upon return to supine, pt tolerated supine ther ex well with Min assist to L LE. Provided pt with supine exercise handout in Irish. PT will continue to follow while in house.     Physical Therapy Treatment completed.   Bed Mobility:  Supine to Sit: (presented at EOB )  Transfers: Sit to Stand: Moderate Assist(with bed height raised)  Gait: Level Of Assist: Moderate Assist with Front-Wheel Walker    x3 ft at EOB- limited by pain   Plan of Care: Will benefit from Physical Therapy 4 times per week  Discharge Recommendations: Equipment: Will Continue to Assess for Equipment Needs.   Post-acute therapy: Recommend post-acute placement for continued physical therapy services prior to discharge home.   Please consider a PHYSIATRY CONSULT to assist DC planning. Pt is very motivated to work with PT and return to independent PLOF.     See \"Rehab Therapy-Acute\" Patient Summary Report for complete documentation.     "

## 2020-04-23 LAB
BASOPHILS # BLD AUTO: 0.5 % (ref 0–1.8)
BASOPHILS # BLD: 0.03 K/UL (ref 0–0.12)
EOSINOPHIL # BLD AUTO: 0.16 K/UL (ref 0–0.51)
EOSINOPHIL NFR BLD: 2.4 % (ref 0–6.9)
ERYTHROCYTE [DISTWIDTH] IN BLOOD BY AUTOMATED COUNT: 46.5 FL (ref 35.9–50)
HCT VFR BLD AUTO: 35.3 % (ref 42–52)
HGB BLD-MCNC: 11.7 G/DL (ref 14–18)
IMM GRANULOCYTES # BLD AUTO: 0.03 K/UL (ref 0–0.11)
IMM GRANULOCYTES NFR BLD AUTO: 0.5 % (ref 0–0.9)
LYMPHOCYTES # BLD AUTO: 1.5 K/UL (ref 1–4.8)
LYMPHOCYTES NFR BLD: 22.7 % (ref 22–41)
MCH RBC QN AUTO: 32.3 PG (ref 27–33)
MCHC RBC AUTO-ENTMCNC: 33.1 G/DL (ref 33.7–35.3)
MCV RBC AUTO: 97.5 FL (ref 81.4–97.8)
MONOCYTES # BLD AUTO: 0.73 K/UL (ref 0–0.85)
MONOCYTES NFR BLD AUTO: 11 % (ref 0–13.4)
NEUTROPHILS # BLD AUTO: 4.16 K/UL (ref 1.82–7.42)
NEUTROPHILS NFR BLD: 62.9 % (ref 44–72)
NRBC # BLD AUTO: 0 K/UL
NRBC BLD-RTO: 0 /100 WBC
PLATELET # BLD AUTO: 150 K/UL (ref 164–446)
PMV BLD AUTO: 10.6 FL (ref 9–12.9)
RBC # BLD AUTO: 3.62 M/UL (ref 4.7–6.1)
WBC # BLD AUTO: 6.6 K/UL (ref 4.8–10.8)

## 2020-04-23 PROCEDURE — 85025 COMPLETE CBC W/AUTO DIFF WBC: CPT

## 2020-04-23 PROCEDURE — 770006 HCHG ROOM/CARE - MED/SURG/GYN SEMI*

## 2020-04-23 PROCEDURE — 700102 HCHG RX REV CODE 250 W/ 637 OVERRIDE(OP): Performed by: INTERNAL MEDICINE

## 2020-04-23 PROCEDURE — 700102 HCHG RX REV CODE 250 W/ 637 OVERRIDE(OP): Performed by: HOSPITALIST

## 2020-04-23 PROCEDURE — 99232 SBSQ HOSP IP/OBS MODERATE 35: CPT | Performed by: INTERNAL MEDICINE

## 2020-04-23 PROCEDURE — 36415 COLL VENOUS BLD VENIPUNCTURE: CPT

## 2020-04-23 PROCEDURE — A9270 NON-COVERED ITEM OR SERVICE: HCPCS | Performed by: HOSPITALIST

## 2020-04-23 PROCEDURE — 700111 HCHG RX REV CODE 636 W/ 250 OVERRIDE (IP): Performed by: HOSPITALIST

## 2020-04-23 PROCEDURE — A9270 NON-COVERED ITEM OR SERVICE: HCPCS | Performed by: INTERNAL MEDICINE

## 2020-04-23 RX ORDER — AMOXICILLIN 250 MG
2 CAPSULE ORAL DAILY
Qty: 30 TAB | Refills: 0
Start: 2020-04-23

## 2020-04-23 RX ORDER — LOSARTAN POTASSIUM 50 MG/1
50 TABLET ORAL DAILY
Qty: 30 TAB
Start: 2020-04-24

## 2020-04-23 RX ORDER — OXYCODONE HYDROCHLORIDE 5 MG/1
5 TABLET ORAL
Qty: 10 TAB | Refills: 0 | Status: SHIPPED | OUTPATIENT
Start: 2020-04-23 | End: 2020-04-26

## 2020-04-23 RX ADMIN — CARVEDILOL 25 MG: 25 TABLET, FILM COATED ORAL at 17:53

## 2020-04-23 RX ADMIN — SENNOSIDES AND DOCUSATE SODIUM 2 TABLET: 8.6; 5 TABLET ORAL at 17:53

## 2020-04-23 RX ADMIN — SENNOSIDES AND DOCUSATE SODIUM 2 TABLET: 8.6; 5 TABLET ORAL at 05:14

## 2020-04-23 RX ADMIN — LOSARTAN POTASSIUM 50 MG: 50 TABLET ORAL at 05:14

## 2020-04-23 RX ADMIN — OXYCODONE HYDROCHLORIDE 5 MG: 5 TABLET ORAL at 17:54

## 2020-04-23 RX ADMIN — ENOXAPARIN SODIUM 40 MG: 100 INJECTION SUBCUTANEOUS at 05:13

## 2020-04-23 RX ADMIN — OXYCODONE HYDROCHLORIDE 5 MG: 5 TABLET ORAL at 08:28

## 2020-04-23 RX ADMIN — CARVEDILOL 25 MG: 25 TABLET, FILM COATED ORAL at 08:23

## 2020-04-23 NOTE — PROGRESS NOTES
Patient is resting/sleeping all shift. no c/o pain or discomfort. patient is stable at this time aox4 with 2L on nasal canula applied. Vs wnl. Noted jamison colored urine in urinal at bedside

## 2020-04-23 NOTE — DISCHARGE SUMMARY
Discharge Summary    CHIEF COMPLAINT ON ADMISSION  Chief Complaint   Patient presents with   • Cough   • Fever   • T-5000 GLF   • Hip Pain     left       Reason for Admission  Droplet Prec     CODE STATUS  Full Code    HPI & HOSPITAL COURSE  This is a 89 y.o. male here with above medical issues.   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  He does follow [facility]. Marshall [facility].    He presented to the ER with a complaint of ground-level fall through the ago when he tripped and fell on the left side he reports that his fall is totally mechanical.  He stated that he did not have loss of consciousness, he did not hit his head.    Upon presentation in ER CT scan of the head did not show any acute intracranial abnormality.  CT abd/pelvis showed Acute left parasymphyseal, inferior pubic ramus fractures,right greater than left periventricular, iliac fossa hemorrhage measuring up to 5 cm in long axis, severe BPH with moderate bladder wall thickening likely chronic outlet obstruction. Orthopedic surgery Dr. Liu evaluated and recs no surgical intervention.    The stay in the hospital patient continue to monitor on the medical surgical floor.  Patient is noted to have a fever on admission of 101.7.  Blood cultures x2 has been pending, procalcitonin normal, ESR 5, lactic acid 1.6 and 1.4, UA showing WBC of 2-5.  Unlikely infectious etiology, continue to monitor the patient clinically; no antibiotics indicated. Of Note, patient is noted to be Covid -ve.    Also patient is not taking any anticoagulation for secondary hypercoagulable state.  He is a started on Lovenox 40 for DVT prophylaxis as per Ortho recommendation.  Patient does have  iliac fossa hemorrhage up to 5 cm, will monitor H&H on a daily basis.    His hemoglobin remained stable and he does not need outpatient anticoagulation is high risk given his age.  I did consult palliative care and the family wants to remain full code full care.  Patient is medically stable to go to a skilled nursing facility for ongoing rehabilitation.  His blood pressure medications were altered as below with good control.        Therefore, he is discharged in fair and stable condition to skilled nursing facility.    The patient met 2-midnight criteria for an inpatient stay at the time of discharge.      FOLLOW UP ITEMS POST DISCHARGE  Follow-up with orthopedic surgery in 4-week  Follow-up with primary care physician 2 weeks    DISCHARGE DIAGNOSES  Principal Problem:    Pubic ramus fracture (HCC) POA: Yes  Active Problems:    Essential hypertension POA: Yes    BPH (benign prostatic hyperplasia) (Chronic) POA: Yes    Paroxysmal atrial fibrillation (HCC) POA: Yes    Secondary hypercoagulable state (HCC) POA: Yes    Normocytic anemia POA: Yes    Iliac Fossa Hemorrhage  POA: Yes    Pacemaker POA: Yes    Pulmonary hypertension (HCC) POA: Yes    Thrombocytopenia (HCC) POA: Yes  Resolved Problems:    * No resolved hospital problems. *      FOLLOW UP  No future appointments.  Butch Junior M.D.  [address]  [facility] [address]  [phone]    Call  Voicemail was left with your provider's office. If you do not hear back from them in 2 business days, please call the office and schedule a hospital follow up. Thank you!       MEDICATIONS ON DISCHARGE     Medication List      START taking these medications      Instructions   losartan 50 MG Tabs  Commonly known as:  COZAAR   Take 1 Tab by mouth every day.  Dose:  50 mg     oxyCODONE immediate-release 5 MG Tabs  Commonly known as:  ROXICODONE   Take 1 Tab by mouth every 3 hours as needed for up to 3 days.  Dose:  5 mg     senna-docusate 8.6-50 MG Tabs  Commonly known  as:  PERICOLACE or SENOKOT S   Take 2 Tabs by mouth every day.  Dose:  2 Tab        CONTINUE taking these medications      Instructions   gabapentin 100 MG Caps  Commonly known as:  NEURONTIN   Take 100 mg by mouth 3 times a day.  Dose:  100 mg     metoprolol 25 MG Tabs  Commonly known as:  LOPRESSOR   Take 25 mg by mouth 2 times a day.  Dose:  25 mg     Non Formulary Request   EMS reports pt took coumadin at home  PT report he take something for blood   Pt also reports taking something for prostate    4/16/2019     tamsulosin 0.4 MG capsule  Commonly known as:  FLOMAX   Take 0.4 mg by mouth every day.  Dose:  0.4 mg        STOP taking these medications    tramadol 50 MG Tabs  Commonly known as:  ULTRAM            Allergies  No Known Allergies    DIET  Orders Placed This Encounter   Procedures   • Diet Order Regular     Standing Status:   Standing     Number of Occurrences:   1     Order Specific Question:   Diet:     Answer:   Regular [1]       ACTIVITY  As tolerated and directed by skilled nursing.  Weight bearing as tolerated    LINES, DRAINS, AND WOUNDS  This is an automated list. Peripheral IVs will be removed prior to discharge.  Peripheral IV 04/20/20 22 G Left Wrist (Active)   Site Assessment Clean;Dry;Intact 4/23/2020  8:28 AM   Dressing Type Transparent 4/23/2020  8:28 AM   Line Status Saline locked 4/23/2020  8:28 AM   Dressing Status Clean;Dry;Intact 4/23/2020  8:28 AM   Dressing Intervention N/A 4/23/2020  8:28 AM   Infiltration Grading ([facility], [facility]) 0 4/23/2020  8:28 AM   Phlebitis Scale ([facility] Only) 0 4/23/2020  8:28 AM          Peripheral IV 04/20/20 22 G Left Wrist (Active)   Site Assessment Clean;Dry;Intact 4/23/2020  8:28 AM   Dressing Type Transparent 4/23/2020  8:28 AM   Line Status Saline locked 4/23/2020  8:28 AM   Dressing Status Clean;Dry;Intact 4/23/2020  8:28 AM   Dressing Intervention N/A 4/23/2020  8:28 AM   Infiltration Grading ([facility], [facility]) 0 4/23/2020  8:28 AM   Phlebitis Scale  ([facility] Only) 0 4/23/2020  8:28 AM               MENTAL STATUS ON TRANSFER  Level of Consciousness: Alert  Orientation : Oriented x 4  Speech: Speech Clear    CONSULTATIONS  ORtho    PROCEDURES  CT-ABDOMEN-PELVIS WITH   Final Result      Acute left parasymphyseal, inferior pubic ramus fractures      Right greater than left perivesicular, iliac fossa hemorrhage measures up to 5 cm long axis      Severe prostate enlargement with moderate bladder wall thickening indicating likely chronic outlet obstruction      Multichamber cardiac enlargement, medium-sized hiatal hernia, cholecystectomy, renal cysts, moderate colonic diverticulosis      CT-HEAD W/O   Final Result      No noncontrast CT evidence of acute intracranial hemorrhage.      Moderate white matter hypodensity is present.  This is a nonspecific finding which usually is found to represent chronic microvascular disease in patient's of this demographic.  Demyelination, age indeterminant ischemia and gliosis are also common    possibilities.      Age-appropriate cerebral volume loss.      A wax plug abuts the left tympanic membrane      DX-HIP-UNILATERAL-WITH PELVIS-1 VIEW LEFT   Final Result      1.  No evidence of left proximal femoral or pelvic fracture.      2.  Old posttraumatic and surgical change of the right proximal femur.      DX-CHEST-PORTABLE (1 VIEW)   Final Result      1.  No acute cardiopulmonary abnormality.   2.  Stable cardiomegaly.            LABORATORY  Lab Results   Component Value Date    SODIUM 133 (L) 04/21/2020    POTASSIUM 3.8 04/21/2020    CHLORIDE 102 04/21/2020    CO2 21 04/21/2020    GLUCOSE 94 04/21/2020    BUN 20 04/21/2020    CREATININE 0.96 04/21/2020        Lab Results   Component Value Date    WBC 6.6 04/23/2020    HEMOGLOBIN 11.7 (L) 04/23/2020    HEMATOCRIT 35.3 (L) 04/23/2020    PLATELETCT 150 (L) 04/23/2020        Total time of the discharge process exceeds 43 minutes.

## 2020-04-23 NOTE — THERAPY
"Occupational Therapy Treatment completed with focus on ADLs, ADL transfers and patient education.  Functional Status:  Pt sitting on BSC on arrival.  Pt stood with Min A & was able to perform personal hygiene with Min A.  Pt was Min A transfer back to bed.  Pt able to don hospital pants with Mod A & socks with Max A seated EOB.  Pt stood with Min A & attempted to take a few steps with FWW, but pt limited by left pelvis pain.  Pt returned to supine in bed with Min A.  Pt encouraged to take some pain meds prior to therapy to allow him to be more successful with less pain.  Plan of Care: Will benefit from Occupational Therapy 3 times per week  Discharge Recommendations:  Equipment Will Continue to Assess for Equipment Needs. Post-acute therapy Recommend post-acute placement for additional occupational therapy services prior to discharge home.    Pt would be a great Rehab candidate as he was independent PTA & is very motivated with goof family support.    See \"Rehab Therapy-Acute\" Patient Summary Report for complete documentation.   "

## 2020-04-23 NOTE — DISCHARGE PLANNING
Spoke To: Elvira  Outcome: [facility] has [facility] bed available today.    Agency/Facility Name: [facility]  Spoke To: Isabell  Outcome: Referral accepted pending insurance auth.  Bed available today.

## 2020-04-23 NOTE — PROGRESS NOTES
Received report and assumed pt care.  A&O x4.  Bed alarm and treaded socks on.  Call light and personal belongings within reach.  Bed locked and at lowest position.  CE GREER.

## 2020-04-24 VITALS
BODY MASS INDEX: 20.99 KG/M2 | WEIGHT: 149.91 LBS | HEIGHT: 71 IN | RESPIRATION RATE: 17 BRPM | OXYGEN SATURATION: 96 % | DIASTOLIC BLOOD PRESSURE: 67 MMHG | HEART RATE: 60 BPM | SYSTOLIC BLOOD PRESSURE: 131 MMHG | TEMPERATURE: 98.7 F

## 2020-04-24 PROCEDURE — A9270 NON-COVERED ITEM OR SERVICE: HCPCS | Performed by: HOSPITALIST

## 2020-04-24 PROCEDURE — 700111 HCHG RX REV CODE 636 W/ 250 OVERRIDE (IP): Performed by: HOSPITALIST

## 2020-04-24 PROCEDURE — 99239 HOSP IP/OBS DSCHRG MGMT >30: CPT | Performed by: INTERNAL MEDICINE

## 2020-04-24 PROCEDURE — 700102 HCHG RX REV CODE 250 W/ 637 OVERRIDE(OP): Performed by: HOSPITALIST

## 2020-04-24 PROCEDURE — A9270 NON-COVERED ITEM OR SERVICE: HCPCS | Performed by: INTERNAL MEDICINE

## 2020-04-24 PROCEDURE — 700102 HCHG RX REV CODE 250 W/ 637 OVERRIDE(OP): Performed by: INTERNAL MEDICINE

## 2020-04-24 RX ADMIN — CARVEDILOL 25 MG: 25 TABLET, FILM COATED ORAL at 09:25

## 2020-04-24 RX ADMIN — LOSARTAN POTASSIUM 50 MG: 50 TABLET ORAL at 04:14

## 2020-04-24 RX ADMIN — ENOXAPARIN SODIUM 40 MG: 100 INJECTION SUBCUTANEOUS at 04:14

## 2020-04-24 RX ADMIN — OXYCODONE HYDROCHLORIDE 5 MG: 5 TABLET ORAL at 14:48

## 2020-04-24 ASSESSMENT — ENCOUNTER SYMPTOMS
ABDOMINAL PAIN: 0
BLURRED VISION: 0
DEPRESSION: 0
MYALGIAS: 1
SENSORY CHANGE: 0
DIARRHEA: 0
FEVER: 0
COUGH: 0
SHORTNESS OF BREATH: 0
PALPITATIONS: 0
FOCAL WEAKNESS: 0

## 2020-04-24 ASSESSMENT — FIBROSIS 4 INDEX: FIB4 SCORE: 3.55

## 2020-04-24 NOTE — PROGRESS NOTES
Called report to Anna Driver RN at [facility]. Patient transfer instructions reviewed with patient. IV removed. Patient transported downstairs via wheelchair for transport to [facility].

## 2020-04-24 NOTE — DISCHARGE PLANNING
[facility] notified of approved authorization and expected transport at 1600. Douglas Pineda notified of transfer. [facility] also notified.

## 2020-04-24 NOTE — PROGRESS NOTES
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  He does follow [facility]. Marshall from [facility].    He presented to the ER with a complaint of ground-level fall through the ago when he tripped and fell on the left side he reports that his fall is totally mechanical.  He stated that he did not have loss of consciousness, he did not hit his head.    Upon presentation in ER CT scan of the head did not show any acute intracranial abnormality.  CT abd/pelvis showed Acute left parasymphyseal, inferior pubic ramus fractures,right greater than left periventricular, iliac fossa hemorrhage measuring up to 5 cm in long axis, severe BPH with moderate bladder wall thickening likely chronic outlet obstruction. Orthopedic surgery Dr. Liu evaluated and recs no surgical intervention.    The stay in the hospital patient continue to monitor on the medical surgical floor.  Patient is noted to have a fever on admission of 101.7.  Blood cultures x2 has been pending, procalcitonin normal, ESR 5, lactic acid 1.6 and 1.4, UA showing WBC of 2-5.  Unlikely infectious etiology, continue to monitor the patient clinically; no antibiotics indicated. Of Note, patient is noted to be Covid -ve.    Also patient is not taking any anticoagulation for secondary hypercoagulable state.  He is a started on Lovenox 40 for DVT prophylaxis as per Ortho recommendation.  Patient does have iliac fossa hemorrhage up to 5 cm, will monitor H&H on a daily basis.         Interval  Problem Update  Multiple fractures-pain remains decently controlled and denies any new neurological symptom.  Hemoglobin remained stable.    Consultants/Specialty   Dr Liu, orthopedic  Consulted palliative care for CODE STATUS discussion is full code    Code Status  full    Disposition  Likely skilled nursing facility but pending physical therapy and Occupational Therapy evaluation, acute rehab declined    Review of Systems  Review of Systems   Constitutional: Negative for fever.   Eyes: Negative for blurred vision.   Respiratory: Negative for cough.    Cardiovascular: Negative for chest pain.   Gastrointestinal: Negative for diarrhea.   Musculoskeletal: Positive for joint pain and myalgias.        Pain in the left hip-remains tolerable   Neurological: Negative for sensory change and focal weakness.   Psychiatric/Behavioral: Negative for depression.   All other systems reviewed and are negative.       Physical Exam  Temp:  [36.5 °C (97.7 °F)-36.8 °C (98.3 °F)] 36.5 °C (97.7 °F)  Pulse:  [60-72] 60  Resp:  [16-18] 16  BP: ()/(54-84) 134/84  SpO2:  [94 %-97 %] 97 %    Physical Exam  Vitals signs and nursing note reviewed.   Constitutional:       Appearance: Normal appearance.      Comments: Mostly Kazakh-speaking  Remains comfortable appearing   HENT:      Head: Normocephalic and atraumatic.   Eyes:      Extraocular Movements: Extraocular movements intact.   Neck:      Musculoskeletal: Neck supple.   Cardiovascular:      Rate and Rhythm: Normal rate.      Pulses: Normal pulses.      Comments: Pacemaker on the left side  Pulmonary:      Effort: Pulmonary effort is normal. No respiratory distress.      Breath sounds: Normal breath sounds.   Abdominal:      General: Abdomen is flat. Bowel sounds are normal.      Palpations: Abdomen is soft.      Tenderness: There is no abdominal tenderness.   Musculoskeletal:      Comments: Range of motion in the left lower extremity is limited secondary to pain-but full range  of motion has increased slightly  Cachectic limb  Warm peripherally 2+ DP pulses equal and symmetric   Skin:     General: Skin is warm.   Neurological:      General: No focal deficit present.      Mental Status: He is alert and oriented to person, place, and time.      Cranial Nerves: No cranial nerve deficit.         Fluids    Intake/Output Summary (Last 24 hours) at 4/24/2020 0653  Last data filed at 4/24/2020 0228  Gross per 24 hour   Intake 570 ml   Output 620 ml   Net -50 ml       Laboratory  Recent Labs     04/22/20  0210 04/23/20  0058   WBC 5.9 6.6   RBC 3.83* 3.62*   HEMOGLOBIN 12.3* 11.7*   HEMATOCRIT 37.8* 35.3*   MCV 98.7* 97.5   MCH 32.1 32.3   MCHC 32.5* 33.1*   RDW 46.5 46.5   PLATELETCT 129* 150*   MPV 11.0 10.6     Recent Labs     04/21/20  0811   SODIUM 133*   POTASSIUM 3.8   CHLORIDE 102   CO2 21   GLUCOSE 94   BUN 20   CREATININE 0.96   CALCIUM 8.4*                   Imaging  CT-ABDOMEN-PELVIS WITH   Final Result      Acute left parasymphyseal, inferior pubic ramus fractures      Right greater than left perivesicular, iliac fossa hemorrhage measures up to 5 cm long axis      Severe prostate enlargement with moderate bladder wall thickening indicating likely chronic outlet obstruction      Multichamber cardiac enlargement, medium-sized hiatal hernia, cholecystectomy, renal cysts, moderate colonic diverticulosis      CT-HEAD W/O   Final Result      No noncontrast CT evidence of acute intracranial hemorrhage.      Moderate white matter hypodensity is present.  This is a nonspecific finding which usually is found to represent chronic microvascular disease in patient's of this demographic.  Demyelination, age indeterminant ischemia and gliosis are also common    possibilities.      Age-appropriate cerebral volume loss.      A wax plug abuts the left tympanic membrane      DX-HIP-UNILATERAL-WITH PELVIS-1 VIEW LEFT   Final Result      1.  No evidence of left proximal femoral or pelvic fracture.      2.   Old posttraumatic and surgical change of the right proximal femur.      DX-CHEST-PORTABLE (1 VIEW)   Final Result      1.  No acute cardiopulmonary abnormality.   2.  Stable cardiomegaly.           Assessment/Plan  * Pubic ramus fracture (HCC)- (present on admission)  Assessment & Plan  Non-operative. Ortho recommended nonsurgical treatment  PT OT consult  Skilled referral has been placed-working with case management on disposition  Provide Lovenox for DVT prophylaxis (discussed with ortho)-hemoglobin remained stable  -No issues of pain and continue current multimodal pain therapy    Essential hypertension- (present on admission)  Assessment & Plan  Continue losartan and Coreg , BP well controlled  (Does have history of CKD but on losartan at home,  Thus continued)    Thrombocytopenia (HCC)- (present on admission)  Assessment & Plan  We will continue to monitor.  Please note that patient is an Lovenox for DVT prophylaxis    Pulmonary hypertension (HCC)- (present on admission)  Assessment & Plan  Hx of  Patient is noted to have pulmonary hypertension on echo in 2016.  He is RSVP is noted to be 55.  He also does have severe tricuspid regurgitation.  -Possibly secondary to intrinsic pulmonary disease but patient has no respiratory symptoms and is not hypoxic      Pacemaker- (present on admission)  Assessment & Plan  Does have pacemaker placement.  Does follow Dr. Marshall as an outpatient    Iliac Fossa Hemorrhage - (present on admission)  Assessment & Plan  Patient is noted to have iliac fossa hemorrhoids of 5 cm.  This is likely secondary to ground-level fall  -Hemoglobin and hematocrit remained stable, no evidence of severe ecchymosis around fracture site, continue daily H&H    Normocytic anemia- (present on admission)  Assessment & Plan  Monitor hemoglobin hematocrit on a daily basis especially in the light of left iliac fossa hemorrhage, remained stable again today    Secondary hypercoagulable state (HCC)- (present  on admission)  Assessment & Plan  Patient stated that he is taking blood thinner but unable to tell what he is he taking.       ----> After discussing with pharmacy it was noted that patient is not taking any anticoagulation.  He was discharged from anticoagulant clinic on 2/4/2020    Suspected COVID-19 virus infection  Assessment & Plan  Covid test is negative     Paroxysmal atrial fibrillation (HCC)- (present on admission)  Assessment & Plan  Rate currently controlled with coreg 25 mg po bid   -As noted above no anticoagulation which is reasonable given age of 89    BPH (benign prostatic hyperplasia)- (present on admission)  Assessment & Plan  Continue tamsulosin 0.4 mg po daily  -Cr is on labs       VTE prophylaxis: Lovenox

## 2020-04-24 NOTE — THERAPY
Physical Therapy Contact Note    Per chart review patient pending DC to SNF today at 1600. Will hold PT for today and resume tomorrow if DC unsuccessful.     Alessandra Knutson, PT, DPT  [phone]

## 2020-04-24 NOTE — THERAPY
Per EMR, pt pending d/c to SNF today at 1600. Will attempt session tomorrow if pt is not d/c from facility today.

## 2020-04-24 NOTE — PROGRESS NOTES
Pt is in bed, in pleasant mood. Discomfort to left hip. No needs currently. Call light within reach and bed in lowest and locked position.

## 2020-04-24 NOTE — DISCHARGE INSTRUCTIONS
Discharge Instructions    Discharged to other by medical transportation with escort. Discharged via wheelchair, hospital escort: Yes.  Special equipment needed: Wheelchair    Be sure to schedule a follow-up appointment with your primary care doctor or any specialists as instructed.     Discharge Plan:   Influenza Vaccine Indication: Not indicated: Previously immunized this influenza season and > 8 years of age    I understand that a diet low in cholesterol, fat, and sodium is recommended for good health. Unless I have been given specific instructions below for another diet, I accept this instruction as my diet prescription.   Other diet: reg    Special Instructions: None    · Is patient discharged on Warfarin / Coumadin?   No     Depression / Suicide Risk    As you are discharged from this [facility] facility, it is important to learn how to keep safe from harming yourself.    Recognize the warning signs:  · Abrupt changes in personality, positive or negative- including increase in energy   · Giving away possessions  · Change in eating patterns- significant weight changes-  positive or negative  · Change in sleeping patterns- unable to sleep or sleeping all the time   · Unwillingness or inability to communicate  · Depression  · Unusual sadness, discouragement and loneliness  · Talk of wanting to die  · Neglect of personal appearance   · Rebelliousness- reckless behavior  · Withdrawal from people/activities they love  · Confusion- inability to concentrate     If you or a loved one observes any of these behaviors or has concerns about self-harm, here's what you can do:  · Talk about it- your feelings and reasons for harming yourself  · Remove any means that you might use to hurt yourself (examples: pills, rope, extension cords, firearm)  · Get professional help from the community (Mental Health, Substance Abuse, psychological counseling)  · Do not be alone:Call your Safe Contact- someone whom you trust who will be  there for you.  · Call your local CRISIS HOTLINE 601-9677 or 301-461-2653  · Call your local Children's Mobile Crisis Response Team [place] (951) 683-7708 or www.[employer]  · Call the toll free National Suicide Prevention Hotlines   · National Suicide Prevention Lifeline 598-053-XXXT (4017)  · National GFRANQ Line Network 800-SUICIDE (938-9141)

## 2020-04-24 NOTE — PROGRESS NOTES
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  He does [facility].    He presented to the ER with a complaint of ground-level fall through the ago when he tripped and fell on the left side he reports that his fall is totally mechanical.  He stated that he did not have loss of consciousness, he did not hit his head.    Upon presentation in ER CT scan of the head did not show any acute intracranial abnormality.  CT abd/pelvis showed Acute left parasymphyseal, inferior pubic ramus fractures,right greater than left periventricular, iliac fossa hemorrhage measuring up to 5 cm in long axis, severe BPH with moderate bladder wall thickening likely chronic outlet obstruction. Orthopedic surgery Dr. Liu evaluated and recs no surgical intervention.    The stay in the hospital patient continue to monitor on the medical surgical floor.  Patient is noted to have a fever on admission of 101.7.  Blood cultures x2 has been pending, procalcitonin normal, ESR 5, lactic acid 1.6 and 1.4, UA showing WBC of 2-5.  Unlikely infectious etiology, continue to monitor the patient clinically; no antibiotics indicated. Of Note, patient is noted to be Covid -ve.    Also patient is not taking any anticoagulation for secondary hypercoagulable state.  He is a started on Lovenox 40 for DVT prophylaxis as per Ortho recommendation.  Patient does have iliac fossa hemorrhage up to 5 cm, will monitor H&H on a daily basis.         Interval  Problem Update  Multiple fractures-minimal issues with pain. Vitals are ok, no behavioral issues. Mild improvement in ROM of the LLE.     Consultants/Specialty   Dr Liu, orthopedic  Consulted palliative care for CODE STATUS discussion is full code    Code Status  full    Disposition  Likely skilled nursing facility but pending physical therapy and Occupational Therapy evaluation, acute rehab declined    Review of Systems  Review of Systems   Constitutional: Negative for fever.   Eyes: Negative for blurred vision.   Respiratory: Negative for shortness of breath.    Cardiovascular: Negative for palpitations.   Gastrointestinal: Negative for abdominal pain.   Musculoskeletal: Positive for joint pain and myalgias.        Pain in the left hip he feels is slightly better today   Neurological: Negative for sensory change and focal weakness.   Psychiatric/Behavioral: Negative for depression.   All other systems reviewed and are negative.       Physical Exam  Temp:  [36.5 °C (97.7 °F)-37.2 °C (99 °F)] 37.2 °C (99 °F)  Pulse:  [60] 60  Resp:  [16-19] 19  BP: ()/(54-84) 142/70  SpO2:  [92 %-97 %] 92 %    Physical Exam  Vitals signs and nursing note reviewed.   Constitutional:       Appearance: Normal appearance.      Comments: Mostly [place]-speaking  Comfortable sitting in bed   HENT:      Head: Normocephalic and atraumatic.   Eyes:      General:         Right eye: No discharge.         Left eye: No discharge.      Extraocular Movements: Extraocular movements intact.   Neck:      Musculoskeletal: Neck supple.   Cardiovascular:      Rate and Rhythm: Normal rate.      Pulses: Normal pulses.      Comments: Pacemaker on the left side  Pulmonary:      Effort: Pulmonary effort is normal.      Breath sounds: Normal breath sounds. No wheezing.   Abdominal:      General: Abdomen is flat. Bowel sounds are normal. There is no distension.      Palpations: Abdomen is soft.   Musculoskeletal:      Comments: Range of motion in the  left lower extremity is limited secondary to pain-rom limited to about 60 degrees  Cachectic limb  Warm peripherally 2+ DP pulses equal and symmetric   Skin:     General: Skin is warm.   Neurological:      General: No focal deficit present.      Mental Status: He is alert and oriented to person, place, and time.      Cranial Nerves: No cranial nerve deficit.         Fluids    Intake/Output Summary (Last 24 hours) at 4/24/2020 1144  Last data filed at 4/24/2020 0930  Gross per 24 hour   Intake 670 ml   Output 810 ml   Net -140 ml       Laboratory  Recent Labs     04/22/20  0210 04/23/20  0058   WBC 5.9 6.6   RBC 3.83* 3.62*   HEMOGLOBIN 12.3* 11.7*   HEMATOCRIT 37.8* 35.3*   MCV 98.7* 97.5   MCH 32.1 32.3   MCHC 32.5* 33.1*   RDW 46.5 46.5   PLATELETCT 129* 150*   MPV 11.0 10.6                       Imaging  CT-ABDOMEN-PELVIS WITH   Final Result      Acute left parasymphyseal, inferior pubic ramus fractures      Right greater than left perivesicular, iliac fossa hemorrhage measures up to 5 cm long axis      Severe prostate enlargement with moderate bladder wall thickening indicating likely chronic outlet obstruction      Multichamber cardiac enlargement, medium-sized hiatal hernia, cholecystectomy, renal cysts, moderate colonic diverticulosis      CT-HEAD W/O   Final Result      No noncontrast CT evidence of acute intracranial hemorrhage.      Moderate white matter hypodensity is present.  This is a nonspecific finding which usually is found to represent chronic microvascular disease in patient's of this demographic.  Demyelination, age indeterminant ischemia and gliosis are also common    possibilities.      Age-appropriate cerebral volume loss.      A wax plug abuts the left tympanic membrane      DX-HIP-UNILATERAL-WITH PELVIS-1 VIEW LEFT   Final Result      1.  No evidence of left proximal femoral or pelvic fracture.      2.  Old posttraumatic and surgical change of the right proximal femur.      DX-CHEST-PORTABLE  (1 VIEW)   Final Result      1.  No acute cardiopulmonary abnormality.   2.  Stable cardiomegaly.           Assessment/Plan  * Pubic ramus fracture (HCC)- (present on admission)  Assessment & Plan  Non-operative. Ortho recommended nonsurgical treatment  PT OT consult  Skilled referral has been placed-working with case management on disposition  Provide Lovenox for DVT prophylaxis (discussed with ortho)-hemoglobin remained stable  -No issues of pain and continue current multimodal pain therapy    Essential hypertension- (present on admission)  Assessment & Plan  Continue losartan and Coreg , BP well controlled  (Does have history of CKD but on losartan at home,  Thus continued)    Thrombocytopenia (HCC)- (present on admission)  Assessment & Plan  We will continue to monitor.  Please note that patient is an Lovenox for DVT prophylaxis    Pulmonary hypertension (HCC)- (present on admission)  Assessment & Plan  Hx of  Patient is noted to have pulmonary hypertension on echo in 2016.  He is RSVP is noted to be 55.  He also does have severe tricuspid regurgitation.  -Possibly secondary to intrinsic pulmonary disease but patient has no respiratory symptoms and is not hypoxic      Pacemaker- (present on admission)  Assessment & Plan  Does have pacemaker placement.  Does follow Dr. Marshall as an outpatient    Iliac Fossa Hemorrhage - (present on admission)  Assessment & Plan  Patient is noted to have iliac fossa hemorrhoids of 5 cm.  This is likely secondary to ground-level fall  -check QOD H/H, vitals are stable, no change on physical exam    Normocytic anemia- (present on admission)  Assessment & Plan  Monitor hemoglobin hematocrit on a daily basis especially in the light of left iliac fossa hemorrhage, remained stable again today    Secondary hypercoagulable state (HCC)- (present on admission)  Assessment & Plan  Patient stated that he is taking blood thinner but unable to tell what he is he taking.       ----> After  discussing with pharmacy it was noted that patient is not taking any anticoagulation.  He was discharged from anticoagulant clinic on 2/4/2020    Suspected COVID-19 virus infection  Assessment & Plan  Covid test is negative     Paroxysmal atrial fibrillation (HCC)- (present on admission)  Assessment & Plan  Rate currently controlled with coreg 25 mg po bid   -As noted above no anticoagulation which is reasonable given age of 89    BPH (benign prostatic hyperplasia)- (present on admission)  Assessment & Plan  Continue tamsulosin 0.4 mg po daily  -Cr is on labs       VTE prophylaxis: Lovenox

## 2020-04-24 NOTE — DISCHARGE PLANNING
Contacted Lydia at [facility] to follow up on pending authorization. Lydia states auth is still pending at this time d/t technical difficulties. CM contact information provided to be notified when auth received.

## 2020-07-10 ENCOUNTER — HOSPITAL ENCOUNTER (EMERGENCY)
Facility: MEDICAL CENTER | Age: 85
End: 2020-07-11
Attending: EMERGENCY MEDICINE
Payer: MEDICARE

## 2020-07-10 ENCOUNTER — APPOINTMENT (OUTPATIENT)
Dept: RADIOLOGY | Facility: MEDICAL CENTER | Age: 85
End: 2020-07-10
Attending: EMERGENCY MEDICINE
Payer: MEDICARE
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
## 2020-07-10 DIAGNOSIS — F10.920 ALCOHOLIC INTOXICATION WITHOUT COMPLICATION (HCC): ICD-10-CM

## 2020-07-10 PROCEDURE — 99285 EMERGENCY DEPT VISIT HI MDM: CPT

## 2020-07-10 PROCEDURE — 70450 CT HEAD/BRAIN W/O DYE: CPT

## 2020-07-11 VITALS
HEIGHT: 71 IN | WEIGHT: 149.91 LBS | TEMPERATURE: 97.9 F | SYSTOLIC BLOOD PRESSURE: 151 MMHG | BODY MASS INDEX: 20.99 KG/M2 | DIASTOLIC BLOOD PRESSURE: 82 MMHG | RESPIRATION RATE: 16 BRPM | HEART RATE: 63 BPM | OXYGEN SATURATION: 94 %

## 2020-07-11 ASSESSMENT — ENCOUNTER SYMPTOMS: FALLS: 1

## 2020-07-11 ASSESSMENT — FIBROSIS 4 INDEX: FIB4 SCORE: 3.55

## 2020-07-11 NOTE — ED PROVIDER NOTES
ED Provider Note    Scribed for Jennifer Lombardi M.D. by Nestor Davey. 7/10/2020, 11:47 PM.    Primary care provider: Butch Junior M.D.  Means of arrival: EMS  History obtained from: Patient/EMS  History limited by: None    CHIEF COMPLAINT  Chief Complaint   Patient presents with   • GLF     Pt was removed from [facility] for intoxication, once outside he fell and stranger called EMS. Pt is not on blood thinners, denies LOC. A&OX4       HPI  Des Keller is a 89 y.o. male who presents to the Emergency Department as a TBI activation status post GLF with possible head injury. EMS reports that the patient [place] a casino for significant alcohol intoxication, and while in the parking lot had an unwitnessed GLF and bystander called EMS. Upon arrival to ED patient is grossly intoxicated and he does not remember the fall, although he is A&O x4. EMS reports a small red jennifer to his posterior scalp but no other signs of trauma. Patient denies taking blood thinners.    REVIEW OF SYSTEMS  Review of Systems   Musculoskeletal: Positive for falls.   All other systems reviewed and are negative.    PAST MEDICAL HISTORY   has a past medical history of A-fib (HCC), BPH (benign prostatic hyperplasia), Hypertension, Hypertension (5/19/2011), Pacemaker, and Renal insufficiency (4/19/2016).    SURGICAL HISTORY   has a past surgical history that includes cholecystectomy and appendectomy.    SOCIAL HISTORY  Social History     Tobacco Use   • Smoking status: Never Smoker   • Smokeless tobacco: Never Used   Substance Use Topics   • Alcohol use: No   • Drug use: Never      Social History     Substance and Sexual Activity   Drug Use Never       FAMILY HISTORY  No family history on file.    CURRENT MEDICATIONS  Current Outpatient Medications:   •  losartan (COZAAR) 50 MG Tab, Take 1 Tab by mouth every day., Disp: 30 Tab, Rfl:   •  senna-docusate (PERICOLACE OR SENOKOT S) 8.6-50 MG Tab, Take 2 Tabs by mouth every  "day., Disp: 30 Tab, Rfl: 0  •  tamsulosin (FLOMAX) 0.4 MG capsule, Take 0.4 mg by mouth every day., Disp: , Rfl:   •  metoprolol (LOPRESSOR) 25 MG Tab, Take 25 mg by mouth 2 times a day., Disp: , Rfl:   •  gabapentin (NEURONTIN) 100 MG Cap, Take 100 mg by mouth 3 times a day., Disp: , Rfl:   •  Non Formulary Request, EMS reports pt took coumadin at home PT report he take something for blood  Pt also reports taking something for prostate  4/16/2019, Disp: , Rfl:     ALLERGIES  No Known Allergies    PHYSICAL EXAM  VITAL SIGNS: /79   Pulse 63   Resp 20   Ht 1.803 m (5' 11\")   Wt 68 kg (149 lb 14.6 oz)   SpO2 95%   BMI 20.91 kg/m²   Vitals reviewed by myself.  Physical Exam  Nursing note and vitals reviewed.  Constitutional: Well-developed and well-nourished. No acute distress.   HENT: Head is normocephalic and atraumatic.  No external signs of trauma, no scalp laceration or hematoma.  No midline cervical spine tenderness.    Eyes: extra-ocular movements intact  Cardiovascular: Regular rate and regular rhythm. No murmur heard.  Pulmonary/Chest: Breath sounds normal. No wheezes or rales.   Abdominal: Soft and non-tender. No distention.    Musculoskeletal: Extremities exhibit normal range of motion without edema or tenderness.   Neurological: Awake and alert, appears mildly clinically intoxicated, able to ambulate with his walker without difficulty.  Skin: Skin is warm and dry. No rash.       DIAGNOSTIC STUDIES    RADIOLOGY  CT-HEAD W/O   Final Result      1.  No acute intracranial abnormality.   2.  Age-consistent atrophy.               INTERPRETING LOCATION:  [address] [place] [address], [id number]        The radiologist's interpretation of all radiological studies have been reviewed by me.    REASSESSMENT    11:47 PM - Patient seen and examined at [facility] des as TBI activation. Discussed plan of care, including ordering CT scan. Patient agrees to the plan of care.      12:23 AM - Patient was " reevaluated at bedside. Discussed radiology results with the patient and informed them they are reassuring. He is requesting to leave, and we notified him that his family is being contacted right now to get him a ride.      12:44 AM - RN successfully got in contact with patient's son, Des Hussein, who agrees to provide safe transport home.     COURSE & MEDICAL DECISION MAKING  Nursing notes, VS, PMSFHx reviewed in chart.    Patient is a 89-year-old male who comes in for ground-level fall with possible hitting his head.  Differential diagnosis includes closed head injury, intracranial hemorrhage, alcohol intoxication.  Diagnostic work-up includes CT of the head.    Patient's initial vitals are within normal limits.  CT of the head returns demonstrates no acute intracranial abnormalities.  Upon reassessment patient has no other complaints.  As patient is clinically intoxicated I do not feel comfortable discharging him by himself.  We spoke with his son who will come and pick the patient up for for safe discharge.  Patient is amenable to this plan.  He is discharged in stable condition.    The patient will return for new or worsening symptoms and is stable at the time of discharge.    The patient is referred to a primary physician for blood pressure management, diabetic screening, and for all other preventative health concerns.    DISPOSITION:  Patient will be discharged home in stable condition.    FOLLOW UP:  Butch Junior M.D.  [address]  [address] [address]  [phone]              FINAL IMPRESSION  1. Fall from ground level    2. Alcoholic intoxication without complication (HCC)          Nestor ALANIZ (Scribleydi), am scribing for, and in the presence of, Jennifer Lombardi M.D..    Electronically signed by: Nestor Davey (Celsa), 7/10/2020    Jennifer ALANIZ M.D. personally performed the services described in this documentation, as scribed by Nestor Davey in my presence, and it is both accurate  and complete. C.    The note accurately reflects work and decisions made by me.  Jennifer Lombardi M.D.  7/11/2020  2:07 AM

## 2020-07-11 NOTE — ED NOTES
Patient returned from imaging, roomed in [facility]. Report given to YESSENIA Knowles. Pt needing redirection to stay on [facility].

## 2020-07-11 NOTE — ED NOTES
Patient discharged with instructions for alcohol intoxication and GLF with prescriptions x0 signs and symptoms explained to patient for reasons to return to emergency department, Patient is understanding and has no further questions. Pt assisted to lobby in wheelchair and into car with family.

## 2020-07-11 NOTE — ED TRIAGE NOTES
Chief Complaint   Patient presents with   • GLF     Pt was removed from [facility] for intoxication, once outside he fell and stranger called EMS. Pt is not on blood thinners, denies LOC. A&OX4       [facility]. Bp 169/89, pulse 70, RR 18, 98% on RA, GCS 15.

## 2021-01-14 DIAGNOSIS — Z23 NEED FOR VACCINATION: ICD-10-CM

## 2021-09-20 NOTE — PROGRESS NOTES
Left nondisplaced parasymphyseal pubic rami fractures, fever of unknown origin, flu negative, initial COVID negative, UA negative.    Plan:  - Admit to medicine  - WBAT BLE, PT consult for mobilization  - DVT prophylaxis  - Ortho to consult in AM   Cartilage Graft Text: The defect edges were debeveled with a #15 scalpel blade.  Given the location of the defect, shape of the defect, the fact the defect involved a full thickness cartilage defect a cartilage graft was deemed most appropriate.  An appropriate donor site was identified, cleansed, and anesthetized. The cartilage graft was then harvested and transferred to the recipient site, oriented appropriately and then sutured into place.  The secondary defect was then repaired using a primary closure.

## 2023-11-29 NOTE — CARE PLAN
Problem: Communication  Goal: The ability to communicate needs accurately and effectively will improve  Outcome: PROGRESSING AS EXPECTED     Problem: Pain Management  Goal: Pain level will decrease to patient's comfort goal  Outcome: PROGRESSING AS EXPECTED     
  Problem: Communication  Goal: The ability to communicate needs accurately and effectively will improve  Outcome: PROGRESSING AS EXPECTED     Problem: Pain Management  Goal: Pain level will decrease to patient's comfort goal  Outcome: PROGRESSING AS EXPECTED     
  Problem: Infection  Goal: Will remain free from infection  Outcome: PROGRESSING AS EXPECTED     Problem: Venous Thromboembolism (VTW)/Deep Vein Thrombosis (DVT) Prevention:  Goal: Patient will participate in Venous Thrombosis (VTE)/Deep Vein Thrombosis (DVT)Prevention Measures  Outcome: PROGRESSING AS EXPECTED     Problem: Bowel/Gastric:  Goal: Normal bowel function is maintained or improved  Outcome: PROGRESSING AS EXPECTED  Goal: Will not experience complications related to bowel motility  Outcome: PROGRESSING AS EXPECTED     
  Problem: Infection  Goal: Will remain free from infection  Outcome: PROGRESSING AS EXPECTED     Problem: Venous Thromboembolism (VTW)/Deep Vein Thrombosis (DVT) Prevention:  Goal: Patient will participate in Venous Thrombosis (VTE)/Deep Vein Thrombosis (DVT)Prevention Measures  Outcome: PROGRESSING AS EXPECTED     Problem: Bowel/Gastric:  Goal: Normal bowel function is maintained or improved  Outcome: PROGRESSING AS EXPECTED  Goal: Will not experience complications related to bowel motility  Outcome: PROGRESSING AS EXPECTED     
  Problem: Infection  Goal: Will remain free from infection  Outcome: PROGRESSING AS EXPECTED     Problem: Venous Thromboembolism (VTW)/Deep Vein Thrombosis (DVT) Prevention:  Goal: Patient will participate in Venous Thrombosis (VTE)/Deep Vein Thrombosis (DVT)Prevention Measures  Outcome: PROGRESSING AS EXPECTED     Problem: Bowel/Gastric:  Goal: Normal bowel function is maintained or improved  Outcome: PROGRESSING AS EXPECTED  Goal: Will not experience complications related to bowel motility  Outcome: PROGRESSING AS EXPECTED     Problem: Discharge Barriers/Planning  Goal: Patient's continuum of care needs will be met  Outcome: PROGRESSING AS EXPECTED     
  Problem: Infection  Goal: Will remain free from infection  Outcome: PROGRESSING AS EXPECTED     Problem: Venous Thromboembolism (VTW)/Deep Vein Thrombosis (DVT) Prevention:  Goal: Patient will participate in Venous Thrombosis (VTE)/Deep Vein Thrombosis (DVT)Prevention Measures  Outcome: PROGRESSING AS EXPECTED     Problem: Respiratory:  Goal: Respiratory status will improve  Outcome: PROGRESSING AS EXPECTED     Problem: Mobility  Goal: Risk for activity intolerance will decrease  Outcome: PROGRESSING AS EXPECTED     Problem: Skin Integrity  Goal: Risk for impaired skin integrity will decrease  Outcome: PROGRESSING AS EXPECTED     
  Problem: Infection  Goal: Will remain free from infection  Outcome: PROGRESSING AS EXPECTED  Intervention: Implement standard precautions and perform hand washing before and after patient contact  Note: Hand hygiene performed before and after pt care.  Gloves worn at all times while caring for pt.      Problem: Venous Thromboembolism (VTW)/Deep Vein Thrombosis (DVT) Prevention:  Goal: Patient will participate in Venous Thrombosis (VTE)/Deep Vein Thrombosis (DVT)Prevention Measures  Outcome: PROGRESSING AS EXPECTED  Intervention: Assess and monitor for anticoagulation complications  Note: Receiving Lovenox     
  Problem: Infection  Goal: Will remain free from infection  Outcome: PROGRESSING AS EXPECTED  Note: Standard precautions in bed. No signs or symptoms of infection.        Problem: Mobility  Goal: Risk for activity intolerance will decrease  Outcome: PROGRESSING AS EXPECTED  Note: 2x assist. Pt able to turn self.      
  Problem: Infection  Goal: Will remain free from infection  Outcome: PROGRESSING AS EXPECTED  Note: Standard precautions in place. No signs or symptoms of infection.       Problem: Bowel/Gastric:  Goal: Normal bowel function is maintained or improved  Outcome: PROGRESSING SLOWER THAN EXPECTED  Note: Large loose bm tonight.        
  Problem: Pain Management  Goal: Pain level will decrease to patient's comfort goal  Outcome: PROGRESSING AS EXPECTED     Problem: Communication  Goal: The ability to communicate needs accurately and effectively will improve  Outcome: PROGRESSING AS EXPECTED     
  Problem: Pain Management  Goal: Pain level will decrease to patient's comfort goal  Outcome: PROGRESSING AS EXPECTED   Will keep pain less than 4 on pain scale per pt MAR  Problem: Safety  Goal: Will remain free from injury  Outcome: PROGRESSING AS EXPECTED   Pt. Will remain free from harm throughout shift.  
  Problem: Respiratory:  Goal: Respiratory status will improve  Outcome: PROGRESSING SLOWER THAN EXPECTED     O2 nasal canula 2 L  Problem: Communication  Goal: The ability to communicate needs accurately and effectively will improve  Outcome: MET     Problem: Pain Management  Goal: Pain level will decrease to patient's comfort goal  Outcome: MET     Problem: Safety  Goal: Will remain free from injury  Outcome: MET  Goal: Will remain free from falls  Outcome: MET     Problem: Knowledge Deficit  Goal: Knowledge of disease process/condition, treatment plan, diagnostic tests, and medications will improve  Outcome: MET  Goal: Knowledge of the prescribed therapeutic regimen will improve  Outcome: MET     
Statement Selected
